# Patient Record
Sex: MALE | Race: BLACK OR AFRICAN AMERICAN | NOT HISPANIC OR LATINO | URBAN - METROPOLITAN AREA
[De-identification: names, ages, dates, MRNs, and addresses within clinical notes are randomized per-mention and may not be internally consistent; named-entity substitution may affect disease eponyms.]

---

## 2017-01-10 ENCOUNTER — EMERGENCY (EMERGENCY)
Facility: HOSPITAL | Age: 47
LOS: 1 days | Discharge: OTHER INSTITUTION | End: 2017-01-10
Attending: EMERGENCY MEDICINE | Admitting: EMERGENCY MEDICINE
Payer: MEDICAID

## 2017-01-10 VITALS
DIASTOLIC BLOOD PRESSURE: 75 MMHG | SYSTOLIC BLOOD PRESSURE: 118 MMHG | OXYGEN SATURATION: 97 % | RESPIRATION RATE: 18 BRPM | HEART RATE: 66 BPM

## 2017-01-10 VITALS
DIASTOLIC BLOOD PRESSURE: 77 MMHG | SYSTOLIC BLOOD PRESSURE: 115 MMHG | HEART RATE: 93 BPM | OXYGEN SATURATION: 96 % | WEIGHT: 175.05 LBS | TEMPERATURE: 98 F | RESPIRATION RATE: 17 BRPM

## 2017-01-10 DIAGNOSIS — Z95.5 PRESENCE OF CORONARY ANGIOPLASTY IMPLANT AND GRAFT: Chronic | ICD-10-CM

## 2017-01-10 LAB
ALBUMIN SERPL ELPH-MCNC: 3.6 G/DL — SIGNIFICANT CHANGE UP (ref 3.4–5)
ALP SERPL-CCNC: 59 U/L — SIGNIFICANT CHANGE UP (ref 40–120)
ALT FLD-CCNC: 76 U/L — HIGH (ref 12–42)
ANION GAP SERPL CALC-SCNC: 11 MMOL/L — SIGNIFICANT CHANGE UP (ref 9–16)
APTT BLD: 28.2 SEC — SIGNIFICANT CHANGE UP (ref 27.5–36.5)
AST SERPL-CCNC: 51 U/L — HIGH (ref 15–37)
BILIRUB SERPL-MCNC: 0.2 MG/DL — SIGNIFICANT CHANGE UP (ref 0.2–1.2)
BUN SERPL-MCNC: 10 MG/DL — SIGNIFICANT CHANGE UP (ref 7–23)
CALCIUM SERPL-MCNC: 9 MG/DL — SIGNIFICANT CHANGE UP (ref 8.5–10.5)
CHLORIDE SERPL-SCNC: 104 MMOL/L — SIGNIFICANT CHANGE UP (ref 96–108)
CK MB BLD-MCNC: 0.97 % — SIGNIFICANT CHANGE UP
CK MB CFR SERPL CALC: 6.3 NG/ML — HIGH (ref 0.5–3.6)
CK SERPL-CCNC: 651 U/L — HIGH (ref 39–308)
CO2 SERPL-SCNC: 26 MMOL/L — SIGNIFICANT CHANGE UP (ref 22–31)
CREAT SERPL-MCNC: 1.18 MG/DL — SIGNIFICANT CHANGE UP (ref 0.5–1.3)
ETHANOL SERPL-MCNC: 63 MG/DL — HIGH
GLUCOSE SERPL-MCNC: 140 MG/DL — HIGH (ref 70–99)
HCT VFR BLD CALC: 36.2 % — LOW (ref 39–50)
HGB BLD-MCNC: 12.2 G/DL — LOW (ref 13–17)
INR BLD: 1.04 — SIGNIFICANT CHANGE UP (ref 0.88–1.16)
MCHC RBC-ENTMCNC: 30 PG — SIGNIFICANT CHANGE UP (ref 27–34)
MCHC RBC-ENTMCNC: 33.7 G/DL — SIGNIFICANT CHANGE UP (ref 32–36)
MCV RBC AUTO: 88.9 FL — SIGNIFICANT CHANGE UP (ref 80–100)
PLATELET # BLD AUTO: 334 K/UL — SIGNIFICANT CHANGE UP (ref 150–400)
POTASSIUM SERPL-MCNC: 3.6 MMOL/L — SIGNIFICANT CHANGE UP (ref 3.5–5.3)
POTASSIUM SERPL-SCNC: 3.6 MMOL/L — SIGNIFICANT CHANGE UP (ref 3.5–5.3)
PROT SERPL-MCNC: 7.2 G/DL — SIGNIFICANT CHANGE UP (ref 6.4–8.2)
PROTHROM AB SERPL-ACNC: 11.4 SEC — SIGNIFICANT CHANGE UP (ref 10–13.1)
RBC # BLD: 4.07 M/UL — LOW (ref 4.2–5.8)
RBC # FLD: 13.3 % — SIGNIFICANT CHANGE UP (ref 10.3–16.9)
SODIUM SERPL-SCNC: 141 MMOL/L — SIGNIFICANT CHANGE UP (ref 132–145)
TROPONIN I SERPL-MCNC: <0.017 NG/ML — LOW (ref 0.02–0.06)
WBC # BLD: 5.2 K/UL — SIGNIFICANT CHANGE UP (ref 3.8–10.5)
WBC # FLD AUTO: 5.2 K/UL — SIGNIFICANT CHANGE UP (ref 3.8–10.5)

## 2017-01-10 PROCEDURE — 93010 ELECTROCARDIOGRAM REPORT: CPT

## 2017-01-10 PROCEDURE — 99291 CRITICAL CARE FIRST HOUR: CPT | Mod: 25

## 2017-01-10 PROCEDURE — 71010: CPT | Mod: 26

## 2017-01-10 RX ORDER — ASPIRIN/CALCIUM CARB/MAGNESIUM 324 MG
162 TABLET ORAL ONCE
Qty: 0 | Refills: 0 | Status: COMPLETED | OUTPATIENT
Start: 2017-01-10 | End: 2017-01-10

## 2017-01-10 RX ORDER — HEPARIN SODIUM 5000 [USP'U]/ML
INJECTION INTRAVENOUS; SUBCUTANEOUS
Qty: 25000 | Refills: 0 | Status: DISCONTINUED | OUTPATIENT
Start: 2017-01-10 | End: 2017-01-14

## 2017-01-10 RX ORDER — NITROGLYCERIN 6.5 MG
0.4 CAPSULE, EXTENDED RELEASE ORAL ONCE
Qty: 0 | Refills: 0 | Status: COMPLETED | OUTPATIENT
Start: 2017-01-10 | End: 2017-01-10

## 2017-01-10 RX ORDER — TICAGRELOR 90 MG/1
180 TABLET ORAL ONCE
Qty: 0 | Refills: 0 | Status: COMPLETED | OUTPATIENT
Start: 2017-01-10 | End: 2017-01-10

## 2017-01-10 RX ORDER — HEPARIN SODIUM 5000 [USP'U]/ML
4000 INJECTION INTRAVENOUS; SUBCUTANEOUS ONCE
Qty: 0 | Refills: 0 | Status: COMPLETED | OUTPATIENT
Start: 2017-01-10 | End: 2017-01-10

## 2017-01-10 RX ORDER — ATORVASTATIN CALCIUM 80 MG/1
80 TABLET, FILM COATED ORAL ONCE
Qty: 0 | Refills: 0 | Status: COMPLETED | OUTPATIENT
Start: 2017-01-10 | End: 2017-01-10

## 2017-01-10 RX ADMIN — HEPARIN SODIUM 950 UNIT(S)/HR: 5000 INJECTION INTRAVENOUS; SUBCUTANEOUS at 07:39

## 2017-01-10 RX ADMIN — Medication 162 MILLIGRAM(S): at 07:10

## 2017-01-10 RX ADMIN — Medication 162 MILLIGRAM(S): at 05:23

## 2017-01-10 RX ADMIN — HEPARIN SODIUM 4000 UNIT(S): 5000 INJECTION INTRAVENOUS; SUBCUTANEOUS at 07:20

## 2017-01-10 RX ADMIN — Medication 0.4 MILLIGRAM(S): at 07:10

## 2017-01-10 RX ADMIN — ATORVASTATIN CALCIUM 80 MILLIGRAM(S): 80 TABLET, FILM COATED ORAL at 07:20

## 2017-01-10 RX ADMIN — TICAGRELOR 180 MILLIGRAM(S): 90 TABLET ORAL at 07:20

## 2017-01-10 NOTE — ED PROVIDER NOTE - CARE PLAN
Principal Discharge DX:	Chest pain, unspecified type Principal Discharge DX:	STEMI (ST elevation myocardial infarction)  Secondary Diagnosis:	Chest pain

## 2017-01-10 NOTE — ED PROVIDER NOTE - MEDICAL DECISION MAKING DETAILS
Pt here with K2 and cocaine use- dev't chest pain with EKG changes in ED and was set up for transfer to  for r/o STEMI. Pt here with K2 and cocaine use- dev't chest pain with EKG changes in ED and was set up for transfer to  for r/o STEMI.  Case discussed with Dr. Napoles who recommends transfer to [facility]. [facility] contacted, DASH announced and case endorsed to Dr. Mota (ED Attending) who has accepted the pt transfer. Case then discussed with Dr. You ([facility] Cardiologist) who recommends initiating Heparin, Brilinta and Atorvastatin. Pt A&Ox3, NAD at this time. DASH Transfer initiated to .

## 2017-01-10 NOTE — ED PROVIDER NOTE - CRITICAL CARE PROVIDED
consultation with other physicians/additional history taking/direct patient care (not related to procedure)/documentation/interpretation of diagnostic studies

## 2017-01-10 NOTE — ED PROVIDER NOTE - DETAILS:
Pt here with crack and K2 use. Complained of chest pressure after some time in ED. Trop neg, second EKG showed evolving ST elevation in lateral leads. Pt revealed that he had hx of CAD and had two stents placed recently at [facility]. Seems to be non-compliant with meds. Case discussed with cards who rec transfer to  for poss STEMI. Pt current saying that he will refuse procedure. Will give NTG. Pt got ASA.

## 2017-01-10 NOTE — ED ADULT NURSE NOTE - CHIEF COMPLAINT QUOTE
bibems after found sleeping on floor of [employer] station, pt. admits to smoking K2 and crack. c/o not feeling well.

## 2017-01-10 NOTE — ED PROVIDER NOTE - OBJECTIVE STATEMENT
45 y/o M with PMH of drug use BIBA from [facility] for drug intoxication. Pt endorses smoking K2 and crack tonight and now endorses dizziness and inability to walk. He otherwise denies any other complaints at this time.    Denies fever, chills, headache, syncope, CP, SOB, abdo pain, N/V/D

## 2017-01-10 NOTE — ED ADULT TRIAGE NOTE - CHIEF COMPLAINT QUOTE
bibems after found sleeping on floor of [employer] station, pt. admits to smoking K2 and crack. c/o not feeling well.

## 2017-01-10 NOTE — ED PROVIDER NOTE - PROGRESS NOTE DETAILS
While in ED, pt states his chest began to feel "heavy" while lying in the stretcher and now endorses having "stents" placed in his chest earlier this year at ?[facility] 2/2 alleged drug-induced ACS. Will initiate a cardiac workup. While in ED, pt states his chest began to feel "heavy" while lying in the stretcher and now endorses having "two stents" placed in his heart 2 weeks ago at [facility] 2/2 alleged drug-induced ACS. Will initiate a cardiac workup. Elevated CKMB. Repeat EKG showing evolution with ST elevation in leads V5-V6. Consult with Dr. Napoles.

## 2017-02-08 ENCOUNTER — EMERGENCY (EMERGENCY)
Facility: HOSPITAL | Age: 47
LOS: 1 days | Discharge: HOME | End: 2017-02-08
Attending: EMERGENCY MEDICINE | Admitting: EMERGENCY MEDICINE
Payer: MEDICAID

## 2017-02-08 VITALS
OXYGEN SATURATION: 98 % | HEART RATE: 74 BPM | RESPIRATION RATE: 16 BRPM | TEMPERATURE: 98 F | DIASTOLIC BLOOD PRESSURE: 63 MMHG | SYSTOLIC BLOOD PRESSURE: 106 MMHG

## 2017-02-08 VITALS
OXYGEN SATURATION: 98 % | DIASTOLIC BLOOD PRESSURE: 74 MMHG | SYSTOLIC BLOOD PRESSURE: 135 MMHG | RESPIRATION RATE: 20 BRPM | HEART RATE: 92 BPM | TEMPERATURE: 98 F

## 2017-02-08 DIAGNOSIS — F10.129 ALCOHOL ABUSE WITH INTOXICATION, UNSPECIFIED: ICD-10-CM

## 2017-02-08 DIAGNOSIS — R07.89 OTHER CHEST PAIN: ICD-10-CM

## 2017-02-08 DIAGNOSIS — F14.10 COCAINE ABUSE, UNCOMPLICATED: ICD-10-CM

## 2017-02-08 DIAGNOSIS — Z95.5 PRESENCE OF CORONARY ANGIOPLASTY IMPLANT AND GRAFT: Chronic | ICD-10-CM

## 2017-02-08 LAB
ALBUMIN SERPL ELPH-MCNC: 3.7 G/DL — SIGNIFICANT CHANGE UP (ref 3.4–5)
ALP SERPL-CCNC: 63 U/L — SIGNIFICANT CHANGE UP (ref 40–120)
ALT FLD-CCNC: 38 U/L — SIGNIFICANT CHANGE UP (ref 12–42)
ANION GAP SERPL CALC-SCNC: 14 MMOL/L — SIGNIFICANT CHANGE UP (ref 9–16)
AST SERPL-CCNC: 33 U/L — SIGNIFICANT CHANGE UP (ref 15–37)
BILIRUB SERPL-MCNC: 0.4 MG/DL — SIGNIFICANT CHANGE UP (ref 0.2–1.2)
BUN SERPL-MCNC: 6 MG/DL — LOW (ref 7–23)
CALCIUM SERPL-MCNC: 8.9 MG/DL — SIGNIFICANT CHANGE UP (ref 8.5–10.5)
CHLORIDE SERPL-SCNC: 104 MMOL/L — SIGNIFICANT CHANGE UP (ref 96–108)
CK SERPL-CCNC: 716 U/L — HIGH (ref 39–308)
CK SERPL-CCNC: 740 U/L — HIGH (ref 39–308)
CO2 SERPL-SCNC: 23 MMOL/L — SIGNIFICANT CHANGE UP (ref 22–31)
CREAT SERPL-MCNC: 1.14 MG/DL — SIGNIFICANT CHANGE UP (ref 0.5–1.3)
ETHANOL SERPL-MCNC: 161 MG/DL — HIGH
GLUCOSE SERPL-MCNC: 85 MG/DL — SIGNIFICANT CHANGE UP (ref 70–99)
HCT VFR BLD CALC: 36.3 % — LOW (ref 39–50)
HGB BLD-MCNC: 12.1 G/DL — LOW (ref 13–17)
MCHC RBC-ENTMCNC: 29.4 PG — SIGNIFICANT CHANGE UP (ref 27–34)
MCHC RBC-ENTMCNC: 33.3 G/DL — SIGNIFICANT CHANGE UP (ref 32–36)
MCV RBC AUTO: 88.3 FL — SIGNIFICANT CHANGE UP (ref 80–100)
PCP SPEC-MCNC: SIGNIFICANT CHANGE UP
PLATELET # BLD AUTO: 299 K/UL — SIGNIFICANT CHANGE UP (ref 150–400)
POTASSIUM SERPL-MCNC: 3.4 MMOL/L — LOW (ref 3.5–5.3)
POTASSIUM SERPL-SCNC: 3.4 MMOL/L — LOW (ref 3.5–5.3)
PROT SERPL-MCNC: 7.1 G/DL — SIGNIFICANT CHANGE UP (ref 6.4–8.2)
RBC # BLD: 4.11 M/UL — LOW (ref 4.2–5.8)
RBC # FLD: 13.7 % — SIGNIFICANT CHANGE UP (ref 10.3–16.9)
SODIUM SERPL-SCNC: 141 MMOL/L — SIGNIFICANT CHANGE UP (ref 132–145)
TROPONIN I SERPL-MCNC: <0.017 NG/ML — LOW (ref 0.02–0.06)
TROPONIN I SERPL-MCNC: <0.017 NG/ML — LOW (ref 0.02–0.06)
WBC # BLD: 6.7 K/UL — SIGNIFICANT CHANGE UP (ref 3.8–10.5)
WBC # FLD AUTO: 6.7 K/UL — SIGNIFICANT CHANGE UP (ref 3.8–10.5)

## 2017-02-08 PROCEDURE — 99285 EMERGENCY DEPT VISIT HI MDM: CPT | Mod: 25

## 2017-02-08 PROCEDURE — 93010 ELECTROCARDIOGRAM REPORT: CPT | Mod: 76

## 2017-02-08 RX ORDER — ASPIRIN/CALCIUM CARB/MAGNESIUM 324 MG
162 TABLET ORAL ONCE
Qty: 0 | Refills: 0 | Status: COMPLETED | OUTPATIENT
Start: 2017-02-08 | End: 2017-02-08

## 2017-02-08 RX ORDER — FAMOTIDINE 10 MG/ML
20 INJECTION INTRAVENOUS ONCE
Qty: 0 | Refills: 0 | Status: COMPLETED | OUTPATIENT
Start: 2017-02-08 | End: 2017-02-08

## 2017-02-08 RX ORDER — SODIUM CHLORIDE 9 MG/ML
1000 INJECTION INTRAMUSCULAR; INTRAVENOUS; SUBCUTANEOUS ONCE
Qty: 0 | Refills: 0 | Status: COMPLETED | OUTPATIENT
Start: 2017-02-08 | End: 2017-02-08

## 2017-02-08 RX ADMIN — Medication 50 MILLIGRAM(S): at 03:52

## 2017-02-08 RX ADMIN — SODIUM CHLORIDE 2000 MILLILITER(S): 9 INJECTION INTRAMUSCULAR; INTRAVENOUS; SUBCUTANEOUS at 03:34

## 2017-02-08 RX ADMIN — FAMOTIDINE 20 MILLIGRAM(S): 10 INJECTION INTRAVENOUS at 02:57

## 2017-02-08 RX ADMIN — Medication 162 MILLIGRAM(S): at 03:47

## 2017-02-08 RX ADMIN — SODIUM CHLORIDE 2000 MILLILITER(S): 9 INJECTION INTRAMUSCULAR; INTRAVENOUS; SUBCUTANEOUS at 02:50

## 2017-02-08 NOTE — ED PROVIDER NOTE - MEDICAL DECISION MAKING DETAILS
pt with cocaine and alcohol use, EKG with early repol changes, labs with elevated CK but trop neg, s/p IVF, ASA, and valium, AFVSS at time of d/c, pt non-toxic appearing and hemodynamically stable, results, ddx, and f/u plans discussed with pt at bedside, d/c'd home to f/u with cardio and detox info provided, strict return precautions discussed, prompt return to ER for any worsening or new sx, pt verbalized understanding. pt with cocaine and alcohol use, EKG with early repol changes, labs with elevated CK but trop neg x 2, s/p IVF, ASA, and valium, AFVSS at time of d/c, pt non-toxic appearing and hemodynamically stable, results, ddx, and f/u plans discussed with pt at bedside, d/c'd home to f/u with cardio and detox info provided, strict return precautions discussed, prompt return to ER for any worsening or new sx, pt verbalized understanding.

## 2017-02-08 NOTE — ED PROVIDER NOTE - CARE PLAN
Principal Discharge DX:	Cocaine abuse  Secondary Diagnosis:	Alcohol intoxication  Secondary Diagnosis:	Chest pain, unspecified type

## 2017-02-08 NOTE — ED ADULT NURSE REASSESSMENT NOTE - NS ED NURSE REASSESS COMMENT FT1
assumed care of patient from RN Kylee; pt sleepign in stretcher in hallway; pt ambulated to bathroom as per RN; will continue to monitor

## 2017-02-08 NOTE — ED PROVIDER NOTE - OBJECTIVE STATEMENT
47 yo M with PMHx of polysubstance and alcohol abuse, CAD s/p stent, BIBA for CP x 1d.  Pt admits to drinking "a lot of alcohol and smoked a lot of crack" yesterday and now with L sided CP and lightheadedness started yesterday.  Denies fever, chills, palpitations, diaphoresis, PEDRO, SOB, orthopnea, hemoptysis, wheezing, peripheral edema, focal weakness, numbness, tingling, paresthesia, HA, dizziness, neck pain, N/V/D/C, abdominal pain, trauma, fall, and rash. Given ASA 162mg PTA to the ED by EMS.

## 2017-02-08 NOTE — ED ADULT NURSE NOTE - CHPI ED SYMPTOMS NEG
no chills/no diaphoresis/no back pain/no cough/no dizziness/no fever/no syncope/no nausea/no shortness of breath/no vomiting

## 2017-12-21 ENCOUNTER — EMERGENCY (EMERGENCY)
Facility: HOSPITAL | Age: 47
LOS: 1 days | Discharge: HOME | End: 2017-12-21
Admitting: EMERGENCY MEDICINE
Payer: MEDICAID

## 2017-12-21 VITALS
HEART RATE: 95 BPM | SYSTOLIC BLOOD PRESSURE: 141 MMHG | OXYGEN SATURATION: 98 % | TEMPERATURE: 98 F | RESPIRATION RATE: 18 BRPM | DIASTOLIC BLOOD PRESSURE: 80 MMHG

## 2017-12-21 DIAGNOSIS — R41.82 ALTERED MENTAL STATUS, UNSPECIFIED: ICD-10-CM

## 2017-12-21 DIAGNOSIS — T40.7X1A POISONING BY CANNABIS (DERIVATIVES), ACCIDENTAL (UNINTENTIONAL), INITIAL ENCOUNTER: ICD-10-CM

## 2017-12-21 DIAGNOSIS — F17.200 NICOTINE DEPENDENCE, UNSPECIFIED, UNCOMPLICATED: ICD-10-CM

## 2017-12-21 DIAGNOSIS — Z95.5 PRESENCE OF CORONARY ANGIOPLASTY IMPLANT AND GRAFT: Chronic | ICD-10-CM

## 2017-12-21 PROCEDURE — 99284 EMERGENCY DEPT VISIT MOD MDM: CPT

## 2017-12-21 NOTE — ED PROVIDER NOTE - OBJECTIVE STATEMENT
46 yo M with PMHx of MI and substance abuse, BIBA for public intoxication. 48 yo M with PMHx of MI and substance abuse, BIBA for public intoxication. Pt admits to smoking K2 today and was feeling nauseated with lightheadedness.  Denies trauma, fall, HA, LOC, bleeding, N/V/D/C, CP, SOB, palpitations, tremors, change in urinary/bowel function, and abdominal pain. Denies alcohol use today

## 2017-12-21 NOTE — ED PROVIDER NOTE - PHYSICAL EXAMINATION
Gen - Unkempt M, no acute distress  Skin - warm, dry, intact  HEENT - AT/NC, PERRL, mild conjunctival injection, o/p clear, uvula midline, airway patent, neck supple with no step off or midline tenderness, FROM   CV - S1S2, R/R/R  Resp - respiration non-labored, CTAB, symmetric bs b/l, no r/r/w  GI - NABS, soft, ND, NT, no rebound or guarding, no CVAT b/l  MS - w/w/p, no c/c/e, calves supple and NT  Neuro - Alert and awake, normal speech, ambulatory steady gait, no focal deficits

## 2017-12-21 NOTE — ED PROVIDER NOTE - MEDICAL DECISION MAKING DETAILS
pt here for public intox, monitored in the ED with stable mental status, offered food, FS wnl, clear speech, ambulatory, medically stable for d/c.

## 2018-06-10 NOTE — ED ADULT NURSE NOTE - NS PRO PASSIVE SMOKE EXP
EXAM: Right humerus, two views 

  

HISTORY:  Fall, injury 

  

COMPARISON:  None. 

  

FINDINGS:  There is an acute fracture involving the head of the right humerus.  There is a crescentri
c 3.4 x 1.0 cm fracture fragment seen adjacent to the lateral cortex of the right humeral head.  Ther
e is no dislocation of the right humeral head.  The scapula appears intact.  Mild degenerative change
s of the right AC joint  seen. 

  

Impression 

  

There is an acute fracture of the periphery or lateral aspect of the right humeral head without dislo
cation of the right humeral head.  There is a laterally displaced crescentric 3.4 x 1.0 cm fracture f
ragment 

  

Transscapular view also shows minimal cortical irregularity medial aspect of the humeral head that ma
y be due to a nondisplaced fracture Injury to the rotator cuff not excluded Unknown

## 2018-10-17 NOTE — ED ADULT NURSE NOTE - PT NEEDS ASSIST
I performed a brief evaluation, including history and physical, of the patient here in triage and I have determined that pt will need further treatment and evaluation from the main side ER physician. I have placed initial orders to help in expediting patients care. October 17, 2018 at 11:42 AM - Jeanie Lo PA-C Visit Vitals /81 (BP 1 Location: Left arm, BP Patient Position: Sitting) Pulse 75 Temp 98.3 °F (36.8 °C) Resp 18 Ht 5' 7\" (1.702 m) Wt 111.6 kg (246 lb) SpO2 99% BMI 38.53 kg/m² no

## 2021-05-28 NOTE — ED ADULT NURSE NOTE - HARM RISK FACTORS
Post infusion patient reconnected to home infusion pump. Had labs drawn at the [facility] yesterday states that he needs them drawn again tomorrow.    yes

## 2021-12-26 ENCOUNTER — HOSPITAL ENCOUNTER (EMERGENCY)
Facility: HOSPITAL | Age: 51
Discharge: HOME | End: 2021-12-26
Attending: EMERGENCY MEDICINE
Payer: COMMERCIAL

## 2021-12-26 VITALS
TEMPERATURE: 99.7 F | OXYGEN SATURATION: 94 % | RESPIRATION RATE: 21 BRPM | WEIGHT: 161.6 LBS | DIASTOLIC BLOOD PRESSURE: 62 MMHG | SYSTOLIC BLOOD PRESSURE: 132 MMHG | HEART RATE: 82 BPM

## 2021-12-26 DIAGNOSIS — U07.1 CLINICAL DIAGNOSIS OF COVID-19: Primary | ICD-10-CM

## 2021-12-26 PROBLEM — F10.11 ALCOHOL ABUSE, IN REMISSION: Chronic | Status: ACTIVE | Noted: 2021-12-26

## 2021-12-26 PROBLEM — B20 HIV (HUMAN IMMUNODEFICIENCY VIRUS INFECTION) (HCC): Chronic | Status: ACTIVE | Noted: 2021-12-26

## 2021-12-26 PROBLEM — F17.200 SMOKER: Chronic | Status: ACTIVE | Noted: 2021-12-26

## 2021-12-26 PROBLEM — F14.11 COCAINE ABUSE IN REMISSION (HCC): Chronic | Status: ACTIVE | Noted: 2021-12-26

## 2021-12-26 PROBLEM — I10 HTN (HYPERTENSION): Chronic | Status: ACTIVE | Noted: 2021-12-26

## 2021-12-26 LAB
ATRIAL RATE: 88 BPM
P AXIS: 56 DEGREES
PR INTERVAL: 146 MS
QRS AXIS: 60 DEGREES
QRSD INTERVAL: 80 MS
QT INTERVAL: 360 MS
QTC INTERVAL: 435 MS
T WAVE AXIS: 51 DEGREES
VENTRICULAR RATE: 88 BPM

## 2021-12-26 PROCEDURE — 93010 ELECTROCARDIOGRAM REPORT: CPT | Performed by: INTERNAL MEDICINE

## 2021-12-26 PROCEDURE — 93005 ELECTROCARDIOGRAM TRACING: CPT

## 2021-12-26 PROCEDURE — 99282 EMERGENCY DEPT VISIT SF MDM: CPT | Performed by: EMERGENCY MEDICINE

## 2021-12-26 PROCEDURE — 87636 SARSCOV2 & INF A&B AMP PRB: CPT | Performed by: EMERGENCY MEDICINE

## 2021-12-26 PROCEDURE — 99284 EMERGENCY DEPT VISIT MOD MDM: CPT

## 2021-12-26 NOTE — ED PROVIDER NOTES
History  Chief Complaint   Patient presents with    Cold Like Symptoms     Patient started having a cough, chills, fever, headache, and weakness starting yesterday morning  Other people in the house are sick and he is vaccinated for covid  Hx of HTN and HIV       3 days ago contact with covid  Pt has had primary vaccinations but no booster  PMH HIV with VL undet; ex-cocaine; smoker; HTN; ex-EtOH  2 days of cough, malaise, fever, chills, mild global gradual onset headache  No neck stiffness/photophobia/rash  Prior to Admission Medications   Prescriptions Last Dose Informant Patient Reported? Taking? NON FORMULARY   Yes Yes   Si tablet daily   NON FORMULARY   Yes Yes   Si tablet daily      Facility-Administered Medications: None       Past Medical History:   Diagnosis Date    [facility]     Hypertension        History reviewed  No pertinent surgical history  History reviewed  No pertinent family history  I have reviewed and agree with the history as documented  E-Cigarette/Vaping    E-Cigarette Use Never User      E-Cigarette/Vaping Substances     Social History     Tobacco Use    Smoking status: Current Every Day Smoker    Smokeless tobacco: Never Used   Vaping Use    Vaping Use: Never used   Substance Use Topics    Alcohol use: Yes     Comment: SOCIAL    Drug use: Never       Review of Systems   Constitutional: Positive for appetite change, chills, diaphoresis, fatigue and fever  HENT: Negative for rhinorrhea  Eyes: Negative for visual disturbance  Respiratory: Positive for cough  Negative for shortness of breath  Cardiovascular: Negative for chest pain  Gastrointestinal: Negative for abdominal pain, diarrhea and vomiting  Endocrine: Negative for polydipsia  Genitourinary: Negative for dysuria, frequency and hematuria  Musculoskeletal: Positive for myalgias  Negative for neck stiffness  Skin: Negative for rash  Allergic/Immunologic: Negative for immunocompromised state  Neurological: Positive for headaches  Negative for speech difficulty, weakness and numbness  Psychiatric/Behavioral: Negative for suicidal ideas  Physical Exam  Physical Exam  Constitutional:       General: He is not in acute distress  HENT:      Head: Normocephalic and atraumatic  Right Ear: External ear normal       Left Ear: External ear normal       Nose: Nose normal       Mouth/Throat:      Pharynx: Oropharynx is clear  Eyes:      Conjunctiva/sclera: Conjunctivae normal    Cardiovascular:      Rate and Rhythm: Normal rate and regular rhythm  Heart sounds: Normal heart sounds  No murmur heard  Pulmonary:      Effort: Pulmonary effort is normal       Breath sounds: Normal breath sounds  Abdominal:      General: Bowel sounds are normal       Palpations: Abdomen is soft  There is no mass  Tenderness: There is no abdominal tenderness  There is no guarding  Musculoskeletal:         General: No swelling or tenderness  Cervical back: Normal range of motion and neck supple  Right lower leg: No edema  Left lower leg: No edema  Skin:     General: Skin is warm and dry  Capillary Refill: Capillary refill takes less than 2 seconds  Neurological:      General: No focal deficit present  Mental Status: He is alert and oriented to person, place, and time     Psychiatric:         Mood and Affect: Mood normal          Vital Signs  ED Triage Vitals   Temperature Pulse Respirations Blood Pressure SpO2   12/26/21 1304 12/26/21 1304 12/26/21 1304 12/26/21 1307 12/26/21 1304   99 7 °F (37 6 °C) 89 18 (!) 172/92 96 %      Temp Source Heart Rate Source Patient Position - Orthostatic VS BP Location FiO2 (%)   12/26/21 1304 12/26/21 1304 12/26/21 1304 12/26/21 1304 --   Temporal Monitor Sitting Right arm       Pain Score       12/26/21 1304       6           Vitals:    12/26/21 1304 12/26/21 1307 12/26/21 1315 12/26/21 1330   BP:  (!) 172/92 (!) 172/92 132/62   Pulse: 89  82 82   Patient Position - Orthostatic VS: Sitting Sitting Lying          Visual Acuity      ED Medications  Medications - No data to display    Diagnostic Studies  Results Reviewed     Procedure Component Value Units Date/Time    COVID/FLU - 24 hour TAT [[id number]] Collected: 12/26/21 1358    Lab Status: In process Specimen: Nasopharyngeal Swab Updated: 12/26/21 1400                 No orders to display              Procedures  Procedures         ED Course                               SBIRT 22yo+      Most Recent Value   SBIRT (25 yo +)    In order to provide better care to our patients, we are screening all of our patients for alcohol and drug use  Would it be okay to ask you these screening questions? Yes Filed at: 12/26/2021 1353   Initial Alcohol Screen: US AUDIT-C     1  How often do you have a drink containing alcohol? 1 Filed at: 12/26/2021 1353   2  How many drinks containing alcohol do you have on a typical day you are drinking? 0 Filed at: 12/26/2021 1353   3a  Male UNDER 65: How often do you have five or more drinks on one occasion? 0 Filed at: 12/26/2021 1353   3b  FEMALE Any Age, or MALE 65+: How often do you have 4 or more drinks on one occassion? 0 Filed at: 12/26/2021 1353   Audit-C Score 1 Filed at: 12/26/2021 1353   LEWIS: How many times in the past year have you    Used an illegal drug or used a prescription medication for non-medical reasons?  Never Filed at: 12/26/2021 1353                    MDM  Number of Diagnoses or Management Options  Clinical diagnosis of COVID-19  Diagnosis management comments: Clinical covid, quarantine and await results, no indication for inpatient care      Disposition  Final diagnoses:   Clinical diagnosis of COVID-19     Time reflects when diagnosis was documented in both MDM as applicable and the Disposition within this note     Time User Action Codes Description Comment    12/26/2021  1:48 PM Gloria Alexander Add [U07 1] Clinical diagnosis of COVID-19       ED Disposition     ED Disposition Condition Date/Time Comment    Discharge Stable Sun Dec 26, 2021  1:47 PM [place] discharge to home/self care  Follow-up Information     Follow up With Specialties Details Why Contact Info    Primary Care Provider - see in next 48 hours  Go in 2 days Return to ER if symptoms worsen or new symptoms develop           Discharge Medication List as of 12/26/2021  1:49 PM      CONTINUE these medications which have NOT CHANGED    Details   !! NON FORMULARY 1 tablet daily, Historical Med      !! NON FORMULARY 1 tablet daily, Historical Med       !! - Potential duplicate medications found  Please discuss with provider  No discharge procedures on file      PDMP Review     None          ED Provider  Electronically Signed by           Angelina Mirza MD  12/26/21 4666

## 2021-12-27 LAB
FLUAV RNA RESP QL NAA+PROBE: NEGATIVE
FLUBV RNA RESP QL NAA+PROBE: NEGATIVE
SARS-COV-2 RNA RESP QL NAA+PROBE: POSITIVE

## 2022-02-17 ENCOUNTER — HOSPITAL ENCOUNTER (EMERGENCY)
Facility: HOSPITAL | Age: 52
End: 2022-02-18
Attending: EMERGENCY MEDICINE
Payer: COMMERCIAL

## 2022-02-17 DIAGNOSIS — I10 HYPERTENSION: ICD-10-CM

## 2022-02-17 DIAGNOSIS — R45.1 AGITATION: ICD-10-CM

## 2022-02-17 DIAGNOSIS — Z00.8 MEDICAL CLEARANCE FOR PSYCHIATRIC ADMISSION: ICD-10-CM

## 2022-02-17 DIAGNOSIS — F20.9 SCHIZOPHRENIA (HCC): Primary | ICD-10-CM

## 2022-02-17 PROCEDURE — 99285 EMERGENCY DEPT VISIT HI MDM: CPT

## 2022-02-17 RX ORDER — DIAZEPAM 5 MG/ML
5 INJECTION, SOLUTION INTRAMUSCULAR; INTRAVENOUS ONCE
Status: DISCONTINUED | OUTPATIENT
Start: 2022-02-18 | End: 2022-02-17

## 2022-02-18 ENCOUNTER — HOSPITAL ENCOUNTER (INPATIENT)
Facility: HOSPITAL | Age: 52
LOS: 7 days | Discharge: HOME | DRG: 753 | End: 2022-02-25
Attending: HOSPITALIST | Admitting: HOSPITALIST
Payer: COMMERCIAL

## 2022-02-18 VITALS
TEMPERATURE: 97.9 F | BODY MASS INDEX: 24.1 KG/M2 | HEART RATE: 79 BPM | OXYGEN SATURATION: 98 % | DIASTOLIC BLOOD PRESSURE: 95 MMHG | RESPIRATION RATE: 18 BRPM | SYSTOLIC BLOOD PRESSURE: 139 MMHG | WEIGHT: 159 LBS | HEIGHT: 68 IN

## 2022-02-18 DIAGNOSIS — F31.5 BIPOLAR I DISORDER, CURRENT OR MOST RECENT EPISODE DEPRESSED, WITH PSYCHOTIC FEATURES (HCC): Primary | Chronic | ICD-10-CM

## 2022-02-18 DIAGNOSIS — Z72.0 TOBACCO ABUSE: ICD-10-CM

## 2022-02-18 DIAGNOSIS — G89.29 CHRONIC PAIN: ICD-10-CM

## 2022-02-18 DIAGNOSIS — E56.9 VITAMIN DEFICIENCY: ICD-10-CM

## 2022-02-18 DIAGNOSIS — Z00.8 MEDICAL CLEARANCE FOR PSYCHIATRIC ADMISSION: ICD-10-CM

## 2022-02-18 DIAGNOSIS — I10 HYPERTENSION: ICD-10-CM

## 2022-02-18 LAB
AMPHETAMINES SERPL QL SCN: NEGATIVE
ANION GAP SERPL CALCULATED.3IONS-SCNC: 14 MMOL/L (ref 4–13)
BARBITURATES UR QL: NEGATIVE
BASOPHILS # BLD AUTO: 0.04 THOUSANDS/ΜL (ref 0–0.1)
BASOPHILS NFR BLD AUTO: 1 % (ref 0–1)
BENZODIAZ UR QL: NEGATIVE
BUN SERPL-MCNC: 12 MG/DL (ref 5–25)
CALCIUM SERPL-MCNC: 9.9 MG/DL (ref 8.3–10.1)
CHLORIDE SERPL-SCNC: 100 MMOL/L (ref 100–108)
CO2 SERPL-SCNC: 24 MMOL/L (ref 21–32)
COCAINE UR QL: NEGATIVE
CREAT SERPL-MCNC: 1.94 MG/DL (ref 0.6–1.3)
EOSINOPHIL # BLD AUTO: 0.09 THOUSAND/ΜL (ref 0–0.61)
EOSINOPHIL NFR BLD AUTO: 2 % (ref 0–6)
ERYTHROCYTE [DISTWIDTH] IN BLOOD BY AUTOMATED COUNT: 12.7 % (ref 11.6–15.1)
FLUAV RNA RESP QL NAA+PROBE: NEGATIVE
FLUBV RNA RESP QL NAA+PROBE: NEGATIVE
GFR SERPL CREATININE-BSD FRML MDRD: 39 ML/MIN/1.73SQ M
GLUCOSE SERPL-MCNC: 94 MG/DL (ref 65–140)
HCT VFR BLD AUTO: 43.2 % (ref 36.5–49.3)
HGB BLD-MCNC: 15.1 G/DL (ref 12–17)
IMM GRANULOCYTES # BLD AUTO: 0.01 THOUSAND/UL (ref 0–0.2)
IMM GRANULOCYTES NFR BLD AUTO: 0 % (ref 0–2)
LYMPHOCYTES # BLD AUTO: 1.79 THOUSANDS/ΜL (ref 0.6–4.47)
LYMPHOCYTES NFR BLD AUTO: 29 % (ref 14–44)
MCH RBC QN AUTO: 30.3 PG (ref 26.8–34.3)
MCHC RBC AUTO-ENTMCNC: 35 G/DL (ref 31.4–37.4)
MCV RBC AUTO: 87 FL (ref 82–98)
METHADONE UR QL: NEGATIVE
MONOCYTES # BLD AUTO: 0.59 THOUSAND/ΜL (ref 0.17–1.22)
MONOCYTES NFR BLD AUTO: 10 % (ref 4–12)
NEUTROPHILS # BLD AUTO: 3.56 THOUSANDS/ΜL (ref 1.85–7.62)
NEUTS SEG NFR BLD AUTO: 59 % (ref 43–75)
NRBC BLD AUTO-RTO: 0 /100 WBCS
OPIATES UR QL SCN: NEGATIVE
OXYCODONE+OXYMORPHONE UR QL SCN: NEGATIVE
PCP UR QL: NEGATIVE
PLATELET # BLD AUTO: 357 THOUSANDS/UL (ref 149–390)
PMV BLD AUTO: 10 FL (ref 8.9–12.7)
POTASSIUM SERPL-SCNC: 3.3 MMOL/L (ref 3.5–5.3)
RBC # BLD AUTO: 4.98 MILLION/UL (ref 3.88–5.62)
RSV RNA RESP QL NAA+PROBE: NEGATIVE
SARS-COV-2 RNA RESP QL NAA+PROBE: NEGATIVE
SODIUM SERPL-SCNC: 138 MMOL/L (ref 136–145)
THC UR QL: POSITIVE
WBC # BLD AUTO: 6.08 THOUSAND/UL (ref 4.31–10.16)

## 2022-02-18 PROCEDURE — 80307 DRUG TEST PRSMV CHEM ANLYZR: CPT | Performed by: EMERGENCY MEDICINE

## 2022-02-18 PROCEDURE — 80048 BASIC METABOLIC PNL TOTAL CA: CPT | Performed by: EMERGENCY MEDICINE

## 2022-02-18 PROCEDURE — 99284 EMERGENCY DEPT VISIT MOD MDM: CPT | Performed by: EMERGENCY MEDICINE

## 2022-02-18 PROCEDURE — 36415 COLL VENOUS BLD VENIPUNCTURE: CPT | Performed by: EMERGENCY MEDICINE

## 2022-02-18 PROCEDURE — 96360 HYDRATION IV INFUSION INIT: CPT

## 2022-02-18 PROCEDURE — 0241U HB NFCT DS VIR RESP RNA 4 TRGT: CPT | Performed by: EMERGENCY MEDICINE

## 2022-02-18 PROCEDURE — 96361 HYDRATE IV INFUSION ADD-ON: CPT

## 2022-02-18 PROCEDURE — 85025 COMPLETE CBC W/AUTO DIFF WBC: CPT | Performed by: EMERGENCY MEDICINE

## 2022-02-18 RX ORDER — ACETAMINOPHEN 325 MG/1
650 TABLET ORAL EVERY 6 HOURS PRN
Status: DISCONTINUED | OUTPATIENT
Start: 2022-02-18 | End: 2022-02-25 | Stop reason: HOSPADM

## 2022-02-18 RX ORDER — BUPROPION HYDROCHLORIDE 150 MG/1
150 TABLET, EXTENDED RELEASE ORAL 2 TIMES DAILY
COMMUNITY
End: 2022-02-25 | Stop reason: HOSPADM

## 2022-02-18 RX ORDER — MINERAL OIL AND PETROLATUM 150; 830 MG/G; MG/G
1 OINTMENT OPHTHALMIC
Status: CANCELLED | OUTPATIENT
Start: 2022-02-18

## 2022-02-18 RX ORDER — TRAZODONE HYDROCHLORIDE 50 MG/1
50 TABLET ORAL
Status: DISCONTINUED | OUTPATIENT
Start: 2022-02-18 | End: 2022-02-21

## 2022-02-18 RX ORDER — ACETAMINOPHEN 325 MG/1
650 TABLET ORAL EVERY 4 HOURS PRN
Status: DISCONTINUED | OUTPATIENT
Start: 2022-02-18 | End: 2022-02-25 | Stop reason: HOSPADM

## 2022-02-18 RX ORDER — ACETAMINOPHEN 325 MG/1
975 TABLET ORAL EVERY 6 HOURS PRN
Status: DISCONTINUED | OUTPATIENT
Start: 2022-02-18 | End: 2022-02-25 | Stop reason: HOSPADM

## 2022-02-18 RX ORDER — BENZTROPINE MESYLATE 1 MG/ML
0.5 INJECTION INTRAMUSCULAR; INTRAVENOUS
Status: CANCELLED | OUTPATIENT
Start: 2022-02-18

## 2022-02-18 RX ORDER — LIDOCAINE 50 MG/G
2 PATCH TOPICAL DAILY
Status: DISCONTINUED | OUTPATIENT
Start: 2022-02-19 | End: 2022-02-20

## 2022-02-18 RX ORDER — LORAZEPAM 2 MG/ML
2 INJECTION INTRAMUSCULAR
Status: CANCELLED | OUTPATIENT
Start: 2022-02-18

## 2022-02-18 RX ORDER — LORAZEPAM 2 MG/ML
1 INJECTION INTRAMUSCULAR EVERY 4 HOURS PRN
Status: CANCELLED | OUTPATIENT
Start: 2022-02-18

## 2022-02-18 RX ORDER — BENZTROPINE MESYLATE 1 MG/ML
1 INJECTION INTRAMUSCULAR; INTRAVENOUS
Status: CANCELLED | OUTPATIENT
Start: 2022-02-18

## 2022-02-18 RX ORDER — HALOPERIDOL 5 MG/ML
2.5 INJECTION INTRAMUSCULAR
Status: CANCELLED | OUTPATIENT
Start: 2022-02-18

## 2022-02-18 RX ORDER — HYDROXYZINE HYDROCHLORIDE 25 MG/1
25 TABLET, FILM COATED ORAL
Status: CANCELLED | OUTPATIENT
Start: 2022-02-18

## 2022-02-18 RX ORDER — ACETAMINOPHEN 325 MG/1
650 TABLET ORAL EVERY 4 HOURS PRN
Status: CANCELLED | OUTPATIENT
Start: 2022-02-18

## 2022-02-18 RX ORDER — BUPROPION HYDROCHLORIDE 75 MG/1
150 TABLET ORAL 2 TIMES DAILY
Status: DISCONTINUED | OUTPATIENT
Start: 2022-02-18 | End: 2022-02-18 | Stop reason: HOSPADM

## 2022-02-18 RX ORDER — MAGNESIUM HYDROXIDE/ALUMINUM HYDROXICE/SIMETHICONE 120; 1200; 1200 MG/30ML; MG/30ML; MG/30ML
30 SUSPENSION ORAL EVERY 4 HOURS PRN
Status: CANCELLED | OUTPATIENT
Start: 2022-02-18

## 2022-02-18 RX ORDER — BENZTROPINE MESYLATE 1 MG/1
1 TABLET ORAL
Status: DISCONTINUED | OUTPATIENT
Start: 2022-02-18 | End: 2022-02-25 | Stop reason: HOSPADM

## 2022-02-18 RX ORDER — LORAZEPAM 2 MG/ML
2 INJECTION INTRAMUSCULAR
Status: DISCONTINUED | OUTPATIENT
Start: 2022-02-18 | End: 2022-02-25 | Stop reason: HOSPADM

## 2022-02-18 RX ORDER — AMLODIPINE BESYLATE 5 MG/1
5 TABLET ORAL DAILY
Status: DISCONTINUED | OUTPATIENT
Start: 2022-02-18 | End: 2022-02-18 | Stop reason: HOSPADM

## 2022-02-18 RX ORDER — BENZTROPINE MESYLATE 1 MG/ML
1 INJECTION INTRAMUSCULAR; INTRAVENOUS
Status: DISCONTINUED | OUTPATIENT
Start: 2022-02-18 | End: 2022-02-25 | Stop reason: HOSPADM

## 2022-02-18 RX ORDER — BISACODYL 10 MG
10 SUPPOSITORY, RECTAL RECTAL DAILY PRN
Status: CANCELLED | OUTPATIENT
Start: 2022-02-18

## 2022-02-18 RX ORDER — HALOPERIDOL 5 MG
5 TABLET ORAL
Status: CANCELLED | OUTPATIENT
Start: 2022-02-18

## 2022-02-18 RX ORDER — BUPROPION HYDROCHLORIDE 75 MG/1
150 TABLET ORAL 2 TIMES DAILY
Status: DISCONTINUED | OUTPATIENT
Start: 2022-02-18 | End: 2022-02-18

## 2022-02-18 RX ORDER — LORAZEPAM 2 MG/ML
1 INJECTION INTRAMUSCULAR
Status: DISCONTINUED | OUTPATIENT
Start: 2022-02-18 | End: 2022-02-25 | Stop reason: HOSPADM

## 2022-02-18 RX ORDER — HYDROXYZINE HYDROCHLORIDE 25 MG/1
50 TABLET, FILM COATED ORAL
Status: CANCELLED | OUTPATIENT
Start: 2022-02-18

## 2022-02-18 RX ORDER — HALOPERIDOL 1 MG/1
2 TABLET ORAL
Status: CANCELLED | OUTPATIENT
Start: 2022-02-18

## 2022-02-18 RX ORDER — LORAZEPAM 1 MG/1
1 TABLET ORAL
Status: DISCONTINUED | OUTPATIENT
Start: 2022-02-18 | End: 2022-02-25 | Stop reason: HOSPADM

## 2022-02-18 RX ORDER — HALOPERIDOL 2 MG/1
2 TABLET ORAL
Status: DISCONTINUED | OUTPATIENT
Start: 2022-02-18 | End: 2022-02-25 | Stop reason: HOSPADM

## 2022-02-18 RX ORDER — BUPROPION HYDROCHLORIDE 75 MG/1
150 TABLET ORAL 2 TIMES DAILY
Status: DISCONTINUED | OUTPATIENT
Start: 2022-02-18 | End: 2022-02-19

## 2022-02-18 RX ORDER — TRAZODONE HYDROCHLORIDE 50 MG/1
50 TABLET ORAL
Status: CANCELLED | OUTPATIENT
Start: 2022-02-18

## 2022-02-18 RX ORDER — LORAZEPAM 1 MG/1
1 TABLET ORAL
Status: CANCELLED | OUTPATIENT
Start: 2022-02-18

## 2022-02-18 RX ORDER — BUPROPION HYDROCHLORIDE 75 MG/1
150 TABLET ORAL 2 TIMES DAILY
Status: CANCELLED | OUTPATIENT
Start: 2022-02-18

## 2022-02-18 RX ORDER — NICOTINE 21 MG/24HR
1 PATCH, TRANSDERMAL 24 HOURS TRANSDERMAL DAILY
Status: DISCONTINUED | OUTPATIENT
Start: 2022-02-18 | End: 2022-02-25 | Stop reason: HOSPADM

## 2022-02-18 RX ORDER — NICOTINE 21 MG/24HR
1 PATCH, TRANSDERMAL 24 HOURS TRANSDERMAL DAILY
Status: CANCELLED | OUTPATIENT
Start: 2022-02-18

## 2022-02-18 RX ORDER — POLYETHYLENE GLYCOL 3350 17 G/17G
17 POWDER, FOR SOLUTION ORAL DAILY PRN
Status: CANCELLED | OUTPATIENT
Start: 2022-02-18

## 2022-02-18 RX ORDER — LANOLIN ALCOHOL/MO/W.PET/CERES
3 CREAM (GRAM) TOPICAL
Status: DISCONTINUED | OUTPATIENT
Start: 2022-02-18 | End: 2022-02-21

## 2022-02-18 RX ORDER — ACETAMINOPHEN 325 MG/1
975 TABLET ORAL EVERY 6 HOURS PRN
Status: CANCELLED | OUTPATIENT
Start: 2022-02-18

## 2022-02-18 RX ORDER — AMOXICILLIN 250 MG
1 CAPSULE ORAL DAILY PRN
Status: CANCELLED | OUTPATIENT
Start: 2022-02-18

## 2022-02-18 RX ORDER — HALOPERIDOL 5 MG
5 TABLET ORAL
Status: DISCONTINUED | OUTPATIENT
Start: 2022-02-18 | End: 2022-02-25 | Stop reason: HOSPADM

## 2022-02-18 RX ORDER — AMLODIPINE BESYLATE 5 MG/1
5 TABLET ORAL DAILY
Status: CANCELLED | OUTPATIENT
Start: 2022-02-19

## 2022-02-18 RX ORDER — ACETAMINOPHEN 325 MG/1
650 TABLET ORAL EVERY 6 HOURS PRN
Status: CANCELLED | OUTPATIENT
Start: 2022-02-18

## 2022-02-18 RX ORDER — BENZTROPINE MESYLATE 1 MG/1
1 TABLET ORAL
Status: CANCELLED | OUTPATIENT
Start: 2022-02-18

## 2022-02-18 RX ORDER — LANOLIN ALCOHOL/MO/W.PET/CERES
3 CREAM (GRAM) TOPICAL
Status: CANCELLED | OUTPATIENT
Start: 2022-02-18

## 2022-02-18 RX ORDER — HYDROXYZINE 50 MG/1
50 TABLET, FILM COATED ORAL
Status: DISCONTINUED | OUTPATIENT
Start: 2022-02-18 | End: 2022-02-25 | Stop reason: HOSPADM

## 2022-02-18 RX ORDER — HALOPERIDOL 5 MG/ML
5 INJECTION INTRAMUSCULAR
Status: CANCELLED | OUTPATIENT
Start: 2022-02-18

## 2022-02-18 RX ORDER — LORAZEPAM 1 MG/1
1 TABLET ORAL ONCE
Status: COMPLETED | OUTPATIENT
Start: 2022-02-18 | End: 2022-02-18

## 2022-02-18 RX ORDER — HALOPERIDOL 5 MG/ML
5 INJECTION INTRAMUSCULAR
Status: DISCONTINUED | OUTPATIENT
Start: 2022-02-18 | End: 2022-02-25 | Stop reason: HOSPADM

## 2022-02-18 RX ORDER — BISACODYL 10 MG
10 SUPPOSITORY, RECTAL RECTAL DAILY PRN
Status: DISCONTINUED | OUTPATIENT
Start: 2022-02-18 | End: 2022-02-25 | Stop reason: HOSPADM

## 2022-02-18 RX ORDER — POLYETHYLENE GLYCOL 3350 17 G/17G
17 POWDER, FOR SOLUTION ORAL DAILY PRN
Status: DISCONTINUED | OUTPATIENT
Start: 2022-02-18 | End: 2022-02-25 | Stop reason: HOSPADM

## 2022-02-18 RX ORDER — MAGNESIUM HYDROXIDE/ALUMINUM HYDROXICE/SIMETHICONE 120; 1200; 1200 MG/30ML; MG/30ML; MG/30ML
30 SUSPENSION ORAL EVERY 4 HOURS PRN
Status: DISCONTINUED | OUTPATIENT
Start: 2022-02-18 | End: 2022-02-25 | Stop reason: HOSPADM

## 2022-02-18 RX ORDER — BENZTROPINE MESYLATE 1 MG/ML
0.5 INJECTION INTRAMUSCULAR; INTRAVENOUS
Status: DISCONTINUED | OUTPATIENT
Start: 2022-02-18 | End: 2022-02-25 | Stop reason: HOSPADM

## 2022-02-18 RX ORDER — LORAZEPAM 2 MG/ML
1 INJECTION INTRAMUSCULAR EVERY 4 HOURS PRN
Status: DISCONTINUED | OUTPATIENT
Start: 2022-02-18 | End: 2022-02-25 | Stop reason: HOSPADM

## 2022-02-18 RX ORDER — MINERAL OIL AND PETROLATUM 150; 830 MG/G; MG/G
1 OINTMENT OPHTHALMIC
Status: DISCONTINUED | OUTPATIENT
Start: 2022-02-18 | End: 2022-02-25 | Stop reason: HOSPADM

## 2022-02-18 RX ORDER — LORAZEPAM 2 MG/ML
1 INJECTION INTRAMUSCULAR
Status: CANCELLED | OUTPATIENT
Start: 2022-02-18

## 2022-02-18 RX ORDER — HYDROXYZINE HYDROCHLORIDE 25 MG/1
25 TABLET, FILM COATED ORAL
Status: DISCONTINUED | OUTPATIENT
Start: 2022-02-18 | End: 2022-02-19

## 2022-02-18 RX ORDER — AMLODIPINE BESYLATE 5 MG/1
5 TABLET ORAL DAILY
Status: DISCONTINUED | OUTPATIENT
Start: 2022-02-19 | End: 2022-02-25 | Stop reason: HOSPADM

## 2022-02-18 RX ORDER — HALOPERIDOL 5 MG/ML
2.5 INJECTION INTRAMUSCULAR
Status: DISCONTINUED | OUTPATIENT
Start: 2022-02-18 | End: 2022-02-25 | Stop reason: HOSPADM

## 2022-02-18 RX ORDER — AMOXICILLIN 250 MG
1 CAPSULE ORAL DAILY PRN
Status: DISCONTINUED | OUTPATIENT
Start: 2022-02-18 | End: 2022-02-25 | Stop reason: HOSPADM

## 2022-02-18 RX ADMIN — AMLODIPINE BESYLATE 5 MG: 5 TABLET ORAL at 09:08

## 2022-02-18 RX ADMIN — BUPROPION HYDROCHLORIDE 150 MG: 75 TABLET, FILM COATED ORAL at 09:09

## 2022-02-18 RX ADMIN — SODIUM CHLORIDE 1000 ML: 0.9 INJECTION, SOLUTION INTRAVENOUS at 00:32

## 2022-02-18 RX ADMIN — NICOTINE POLACRILEX 2 MG: 2 GUM, CHEWING BUCCAL at 17:04

## 2022-02-18 RX ADMIN — TRAZODONE HYDROCHLORIDE 50 MG: 50 TABLET ORAL at 21:03

## 2022-02-18 RX ADMIN — LORAZEPAM 1 MG: 1 TABLET ORAL at 13:12

## 2022-02-18 RX ADMIN — Medication 3 MG: at 20:33

## 2022-02-18 RX ADMIN — BICTEGRAVIR SODIUM, EMTRICITABINE, AND TENOFOVIR ALAFENAMIDE FUMARATE 1 TABLET: 50; 200; 25 TABLET ORAL at 07:48

## 2022-02-18 RX ADMIN — BUPROPION HYDROCHLORIDE 150 MG: 75 TABLET, FILM COATED ORAL at 15:41

## 2022-02-18 RX ADMIN — NICOTINE 1 PATCH: 14 PATCH, EXTENDED RELEASE TRANSDERMAL at 14:15

## 2022-02-18 NOTE — ED NOTES
Pt complaining of increased anxiety  Remains cooperative with staff   Dr Ligia Joshi made aware and new order noted     Dm Rand, RN  02/18/22 2459

## 2022-02-18 NOTE — ED NOTES
Per delphine avendaño crisis, patient meets criteria for inpatient psych treatment  Will fax over assessment  States patient wants to sign a 201  Per provider patient does not need a 1:1 and can be Q15 minute checks        Gertrude Schroeder RN  02/18/22 5981

## 2022-02-18 NOTE — ED NOTES
Pt remains cooperative and respectful towards staff at this time     Aaron Ramírez, SASCHA  02/18/22 6337

## 2022-02-18 NOTE — EMTALA/ACUTE CARE TRANSFER
[facility] EMERGENCY DEPARTMENT  [address] [address]  Dept: [phone]      EMTALA TRANSFER CONSENT    NAME Keene Buerger DOB 1970                              MRN [id number]    I have been informed of my rights regarding examination, treatment, and transfer   by Dr Nayak att  providers found    Benefits: Other benefits (Include comment)_______________________ (behavioral health)    Risks: Potential for delay in receiving treatment      Consent for Transfer:  I acknowledge that my medical condition has been evaluated and explained to me by the emergency department physician or other qualified medical person and/or my attending physician, who has recommended that I be transferred to the service of  Accepting Physician: Lizzette Llamas PA-C at [address] [place] [address] Name, [address] : [address] [facility],[address]  The above potential benefits of such transfer, the potential risks associated with such transfer, and the probable risks of not being transferred have been explained to me, and I fully understand them  The doctor has explained that, in my case, the benefits of transfer outweigh the risks  I agree to be transferred  I authorize the performance of emergency medical procedures and treatments upon me in both transit and upon arrival at the receiving facility  Additionally, I authorize the release of any and all medical records to the receiving facility and request they be transported with me, if possible  I understand that the safest mode of transportation during a medical emergency is an ambulance and that the Hospital advocates the use of this mode of transport  Risks of traveling to the receiving facility by car, including absence of medical control, life sustaining equipment, such as oxygen, and medical personnel has been explained to me and I fully understand them      (RICK CORRECT BOX BELOW)  [  ]  I consent to the stated transfer and to be transported by ambulance/helicopter  [  ]  I consent to the stated transfer, but refuse transportation by ambulance and accept full responsibility for my transportation by car  I understand the risks of non-ambulance transfers and I exonerate the Hospital and its staff from any deterioration in my condition that results from this refusal     X___________________________________________    DATE  22  TIME________  Signature of patient or legally responsible individual signing on patient behalf           RELATIONSHIP TO PATIENT_________________________          Provider Certification    NAME Meenu Polanco                                        [facility] 1970                              MRN [id number]    A medical screening exam was performed on the above named patient  Based on the examination:    Condition Necessitating Transfer The primary encounter diagnosis was Schizophrenia ([facility] [address] )  Diagnoses of Agitation, Hypertension, and Medical clearance for psychiatric admission were also pertinent to this visit      Patient Condition: The patient has been stabilized such that within reasonable medical probability, no material deterioration of the patient condition or the condition of the unborn child(sayda) is likely to result from the transfer    Reason for Transfer: Level of Care needed not available at this facility    Transfer Requirements: [address]   · Space available and qualified personnel available for treatment as acknowledged by Adelso Lyons  · Agreed to accept transfer and to provide appropriate medical treatment as acknowledged by       Pradeep Magana PA-C  · Appropriate medical records of the examination and treatment of the patient are provided at the time of transfer   [address] [facility], [address] _______  · Transfer will be performed by qualified personnel from [address] [place] [address]  and appropriate transfer equipment as required, including the use of necessary and appropriate life support measures  Provider Certification: I have examined the patient and explained the following risks and benefits of being transferred/refusing transfer to the patient/family:  General risk, such as traffic hazards, adverse weather conditions, rough terrain or turbulence, possible failure of equipment (including vehicle or aircraft), or consequences of actions of persons outside the control of the transport personnel      Based on these reasonable risks and benefits to the patient and/or the unborn child(sayda), and based upon the information available at the time of the patients examination, I certify that the medical benefits reasonably to be expected from the provision of appropriate medical treatments at another medical facility outweigh the increasing risks, if any, to the individuals medical condition, and in the case of labor to the unborn child, from effecting the transfer      X____________________________________________ DATE 02/18/22        TIME_______      ORIGINAL - SEND TO MEDICAL RECORDS   COPY - SEND WITH PATIENT DURING TRANSFER

## 2022-02-18 NOTE — ED NOTES
Patient continues to get up OOB, bang on the window after being told multiple times to stay seated  Patient continues to ask to speak with police, I told him that I already talked to them and that they said he spoke with them  Patient states he is paranoid that someone is going to come here and shoot him        Cullen Pacheco RN  02/18/22 3063

## 2022-02-18 NOTE — ED NOTES
Crisis met with Patient in ED 3  Patient was calm and cooperative while speaking to Crisis  Patient stated he is feeling better now but is still requesting inpatient treatment  Patient agreed to sign a 201 after rights and a detailed explanation of the 72 hr notice were read to and reviewed with him
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
## 2022-02-18 NOTE — PLAN OF CARE
Problem: ANXIETY  Goal: Will report anxiety at manageable levels  Description: INTERVENTIONS:  - Administer medication as ordered  - Teach and encourage coping skills  - Provide emotional support  - Assess patient/family for anxiety and ability to cope  Outcome: Progressing     Problem: SUBSTANCE USE/ABUSE  Goal: Will have no detox symptoms and will verbalize plan for changing substance-related behavior  Description: INTERVENTIONS:  - Monitor physical status and assess for symptoms of withdrawal  - Administer medication as ordered  - Provide emotional support with 1 on 1 interaction with staff  - Encourage recovery focused program/ addiction education  - Assess for verbalization of changing behaviors related to substance abuse  - Initiate consults and referrals as appropriate (Case Management, Spiritual Care, etc )  Outcome: Progressing     Problem: DEPRESSION  Goal: Will be euthymic at discharge  Description: INTERVENTIONS:  - Administer medication as ordered  - Provide emotional support via 1:1 interaction with staff  - Encourage involvement in milieu/groups/activities  - Monitor for social isolation  Outcome: Not Progressing     Problem: ANXIETY  Goal: By discharge: Patient will verbalize 2 strategies to deal with anxiety  Description: Interventions:  - Identify any obvious source/trigger to anxiety  - Staff will assist patient in applying identified coping technique/skills  - Encourage attendance of scheduled groups and activities  Outcome: Not Progressing     Problem: SUBSTANCE USE/ABUSE  Goal: By discharge, will develop insight into their chemical dependency and sustain motivation to continue in recovery  Description: INTERVENTIONS:  - Attends all daily group sessions and scheduled AA groups  - Actively practices coping skills through participation in the therapeutic community and adherence to program rules  - Reviews and completes assignments from individual treatment plan  - Assist patient development of understanding of their personal cycle of addiction and relapse triggers  Outcome: Not Progressing  Goal: By discharge, patient will have ongoing treatment plan addressing chemical dependency  Description: INTERVENTIONS:  - Assist patient with resources and/or appointments for ongoing recovery based living  Outcome: Not Progressing

## 2022-02-18 NOTE — ED NOTES
Pt dozing intervals   Arouses easily and cooperative with staff at this time     Rosa Elena Magaña, SASCHA  02/18/22 0749
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
## 2022-02-18 NOTE — ED NOTES
Report called to ramila christensen at [facility] behavioral health  [facility] also made aware meds in biohazard bag destroyed due to could not identify same       Adriana Tony RN  02/18/22 4702
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
## 2022-02-18 NOTE — ED NOTES
Patient currently on the phone with Jacy from [address]        Dalia Barksdale, SASCHA  02/18/22 1471

## 2022-02-18 NOTE — ED NOTES
Found two types of pills in patient's shoes  Placed in bio bag in medication room        Omar Veras RN  02/18/22 2092
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
## 2022-02-18 NOTE — ED NOTES
Patient is accepted at [facility]  Patient is accepted by Romana Prose, PA-C per Morgan Sims  Transportation is arranged with [employer]  Transportation is scheduled for 1330  Patient may go to the floor before 1500  Nurse report is to be called to [phone] prior to patient transfer      Crisis logged the transport with Willa brown [facility]

## 2022-02-18 NOTE — ED NOTES
Patient OOB again looking out doors and looking around room        Yevgeniy Stewart RN  02/18/22 5695
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
## 2022-02-18 NOTE — ED NOTES
[facility] crisis contacted for crisis consult  Will call back        Tennille Perla RN  02/18/22 6012

## 2022-02-19 PROBLEM — F31.5 BIPOLAR I DISORDER, CURRENT OR MOST RECENT EPISODE DEPRESSED, WITH PSYCHOTIC FEATURES (HCC): Chronic | Status: ACTIVE | Noted: 2022-02-19

## 2022-02-19 PROBLEM — F31.5 BIPOLAR I DISORDER, CURRENT OR MOST RECENT EPISODE DEPRESSED, WITH PSYCHOTIC FEATURES (HCC): Status: ACTIVE | Noted: 2022-02-19

## 2022-02-19 PROCEDURE — 99222 1ST HOSP IP/OBS MODERATE 55: CPT | Performed by: PSYCHIATRY & NEUROLOGY

## 2022-02-19 RX ORDER — HYDROXYZINE HYDROCHLORIDE 25 MG/1
25 TABLET, FILM COATED ORAL
Status: DISCONTINUED | OUTPATIENT
Start: 2022-02-19 | End: 2022-02-25 | Stop reason: HOSPADM

## 2022-02-19 RX ORDER — HYDROXYZINE HYDROCHLORIDE 25 MG/1
25 TABLET, FILM COATED ORAL
Status: DISCONTINUED | OUTPATIENT
Start: 2022-02-19 | End: 2022-02-25

## 2022-02-19 RX ORDER — BUPROPION HYDROCHLORIDE 150 MG/1
150 TABLET ORAL DAILY
Status: DISCONTINUED | OUTPATIENT
Start: 2022-02-20 | End: 2022-02-25 | Stop reason: HOSPADM

## 2022-02-19 RX ORDER — FOLIC ACID 1 MG/1
1 TABLET ORAL DAILY
Status: DISCONTINUED | OUTPATIENT
Start: 2022-02-19 | End: 2022-02-25 | Stop reason: HOSPADM

## 2022-02-19 RX ORDER — POTASSIUM CHLORIDE 20 MEQ/1
40 TABLET, EXTENDED RELEASE ORAL DAILY
Status: COMPLETED | OUTPATIENT
Start: 2022-02-19 | End: 2022-02-23

## 2022-02-19 RX ORDER — PALIPERIDONE 3 MG/1
3 TABLET, EXTENDED RELEASE ORAL DAILY
Status: DISCONTINUED | OUTPATIENT
Start: 2022-02-19 | End: 2022-02-21

## 2022-02-19 RX ORDER — LANOLIN ALCOHOL/MO/W.PET/CERES
100 CREAM (GRAM) TOPICAL DAILY
Status: DISCONTINUED | OUTPATIENT
Start: 2022-02-19 | End: 2022-02-25 | Stop reason: HOSPADM

## 2022-02-19 RX ADMIN — Medication 3 MG: at 21:05

## 2022-02-19 RX ADMIN — LORAZEPAM 1 MG: 1 TABLET ORAL at 20:14

## 2022-02-19 RX ADMIN — BICTEGRAVIR SODIUM, EMTRICITABINE, AND TENOFOVIR ALAFENAMIDE FUMARATE 1 TABLET: 50; 200; 25 TABLET ORAL at 08:30

## 2022-02-19 RX ADMIN — Medication 1 TABLET: at 14:35

## 2022-02-19 RX ADMIN — LIDOCAINE 2 PATCH: 50 PATCH TOPICAL at 08:34

## 2022-02-19 RX ADMIN — HYDROXYZINE HYDROCHLORIDE 25 MG: 25 TABLET, FILM COATED ORAL at 21:06

## 2022-02-19 RX ADMIN — PALIPERIDONE 3 MG: 3 TABLET, EXTENDED RELEASE ORAL at 10:03

## 2022-02-19 RX ADMIN — AMLODIPINE BESYLATE 5 MG: 5 TABLET ORAL at 08:29

## 2022-02-19 RX ADMIN — BUPROPION HYDROCHLORIDE 150 MG: 75 TABLET, FILM COATED ORAL at 08:29

## 2022-02-19 RX ADMIN — HYDROXYZINE HYDROCHLORIDE 50 MG: 50 TABLET, FILM COATED ORAL at 14:36

## 2022-02-19 RX ADMIN — FOLIC ACID 1 MG: 1 TABLET ORAL at 14:35

## 2022-02-19 RX ADMIN — NICOTINE 1 PATCH: 14 PATCH, EXTENDED RELEASE TRANSDERMAL at 08:34

## 2022-02-19 RX ADMIN — THIAMINE HCL TAB 100 MG 100 MG: 100 TAB at 14:35

## 2022-02-19 RX ADMIN — POTASSIUM CHLORIDE 40 MEQ: 1500 TABLET, EXTENDED RELEASE ORAL at 14:35

## 2022-02-19 NOTE — NURSING NOTE
Message: Pt has home medications to be sent to pharmacy   Placed in pharmacy return bin in sealed bag #[id number]   Sent: [phone] by Francia Chowdhury RN

## 2022-02-19 NOTE — PLAN OF CARE
Problem: Ineffective Coping  Goal: Participates in unit activities  Description: Interventions:  - Provide therapeutic environment   - Provide required programming   - Redirect inappropriate behaviors   Outcome: Not Progressing     Problem: ANXIETY  Goal: Will report anxiety at manageable levels  Description: INTERVENTIONS:  - Administer medication as ordered  - Teach and encourage coping skills  - Provide emotional support  - Assess patient/family for anxiety and ability to cope  Outcome: Not Progressing     Problem: SUBSTANCE USE/ABUSE  Goal: Will have no detox symptoms and will verbalize plan for changing substance-related behavior  Description: INTERVENTIONS:  - Monitor physical status and assess for symptoms of withdrawal  - Administer medication as ordered  - Provide emotional support with 1 on 1 interaction with staff  - Encourage recovery focused program/ addiction education  - Assess for verbalization of changing behaviors related to substance abuse  - Initiate consults and referrals as appropriate (Case Management, Spiritual Care, etc )  Outcome: Progressing

## 2022-02-19 NOTE — PROGRESS NOTES
Progress Note - Joe Hudson 54 y o  male MRN: [id number]    Unit/Bed#: [facility] 254-02 Encounter: [id number]        Subjective:   Patient seen and examined at bedside after reviewing the chart and discussing the case with the caring staff  Patient examined at bedside  Patient has no acute issues  Patient's labs including CMP, vitamin-D and vitamin B12 levels are still pending  Physical Exam   Vitals: Blood pressure 117/82, pulse 79, temperature 98 7 °F (37 1 °C), temperature source Temporal, resp  rate 18, height 5' 8" (1 727 m), weight 69 2 kg (152 lb 8 oz), SpO2 97 %  ,Body mass index is 23 19 kg/m²  Constitutional: He appears well-developed  HEENT: PERR, EOMI, MMM  Cardiovascular: Normal rate and regular rhythm  Pulmonary/Chest: Effort normal and breath sounds normal    Abdomen: Soft, + BS, NT    Assessment/Plan:  Joe Hudson is a(n) 54 y o  male with MDD      1  Hypertension  Patient is on amlodipine 5 mg daily  2  Tobacco abuse  Patient may use nicotine transdermal patch 14 mg/24 hr   3  Alcohol abuse  I will put the patient on thiamine, folic acid and multivitamin  4  Arthralgias/low back pain  Patient may take Tylenol as needed, lidocaine patch daily to hip and low back  5  HIV disease  Patient is taking Biktarvy -25 mg daily  6  Insomnia  Patient is taking melatonin 3 mg daily  7  COURTNEY  Patient's creatinine was elevated at 1 94 mg/dL  He received IV fluids in the ED  Repeat CMP in the morning  8  Hypokalemia  Potassium level on 02/18/2022 was low at 3 3 mmol/L  Repeat CMP in the morning

## 2022-02-19 NOTE — NURSING NOTE
Patient visible in dayroom  Pleasant and cooperative, Bright on approach  Patient social with staff and peers  Patient denies SI,HI,AVH, anxiety or depression  Safety checks Q 7 minutes continue

## 2022-02-19 NOTE — PROGRESS NOTES
Patient came to [facility] with belongings present  This T inventoried patient's belongings with patient present  Patient and this T signed off on belongings on a personal belongings checklist  Belongings listed below:    Belongings to remain with patient:    1x pair of shoes (laces removed)  1x underwear  1x pants  1x t-shirt      Belongings in contraband storage:    1x lighter  1x gold chain  1x pair of shoe laces  1x zip-up sweater    1x wallet  1x PA ID  1x SSN card  1x Birth Certificate  Various Coins/Change      5x unmarked pills with 9883 on them  2x unmarked [place] pills with 525 on them (both these medications given to nursing staff in a sealed medication bag   Both this T and nursing staff signed the bag)
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
## 2022-02-19 NOTE — NURSING NOTE
Patient compliant with meds and meals  At this time patient denies everything  Patient is calm and cooperative but has an irritable edge  Patient is visible and social on the unit  Q 7 min behavioral and safety checks in place

## 2022-02-19 NOTE — TREATMENT PLAN
TREATMENT PLAN REVIEW - [facility] Ashwin Talia 54 y o  1970 male MRN: [id number]    [facility] [address] Room / Bed: [place] [address]/[facility] 436[address]73 Encounter: [id number]          Admit Date/Time:  2/18/2022  2:03 PM    Treatment Team: Attending Provider: Mick Feldman MD; Consulting Physician: Mylene Lynch MD; Patient Care Technician: Alexandra Quiroga; Patient Care Assistant: Eli Guzmán;  Patient Care Assistant: Valdez Gómez; Patient Care Assistant: Angy Loaiza; Registered Nurse: Charleen Fernando    Diagnosis: Principal Problem:    Bipolar I disorder, current or most recent episode depressed, with psychotic features [facility]      Patient Strengths/Assets: ability for insight, adapts well, average or above intelligence, cooperative, communication skills, general fund of knowledge, motivated, motivation for treatment/growth, negotiates basic needs, patient is on a voluntary commitment, patient is willing to work on problems, special hobby/interest, work skills    Patient Barriers/Limitations: financial instability, homeless, lack of financial means, lack of social/family support, limited education, limited family ties, limited support system, low self esteem, poor past treatment response, substance abuse    Short Term Goals: decrease in depressive symptoms, decrease in anxiety symptoms, decrease in paranoid thoughts, decrease in psychotic symptoms, ability to stay safe on the unit, ability to stay free of restraints, improvement in insight, mood stabilization, increase in socialization with peers on the unit, acceptance of need for psychiatric treatment, acceptance of psychiatric medications    Long Term Goals: improvement in depression, improvement in anxiety, stabilization of mood, resolution of psychotic symptoms, improved insight, acceptance of need for psychiatric medications, acceptance of need for psychiatric treatment, acceptance of need for psychiatric follow up after discharge, acceptance of psychiatric diagnosis, appropriate interaction with peers, appropriate interaction with family, stable living arrangements upon discharge    Progress Towards Goals: Patient is new to inpatient unit    Recommended Treatment: medication management, patient medication education, group therapy, milieu therapy, continued Behavioral Health psychiatric evaluation/assessment process    Treatment Frequency: daily medication monitoring, group and milieu therapy daily, monitoring through interdisciplinary rounds, monitoring through weekly patient care conferences    Expected Discharge Date:  10 days    Discharge Plan: placement in alternative living arrangement, referral for outpatient medication management with a psychiatrist, referrals as indicated    Treatment Plan Created/Updated By: Anahi Luke DO

## 2022-02-20 PROCEDURE — 99232 SBSQ HOSP IP/OBS MODERATE 35: CPT | Performed by: PSYCHIATRY & NEUROLOGY

## 2022-02-20 RX ORDER — LIDOCAINE 50 MG/G
2 PATCH TOPICAL DAILY
Status: DISCONTINUED | OUTPATIENT
Start: 2022-02-20 | End: 2022-02-21

## 2022-02-20 RX ADMIN — Medication 1 TABLET: at 14:01

## 2022-02-20 RX ADMIN — NICOTINE POLACRILEX 2 MG: 2 GUM, CHEWING BUCCAL at 20:43

## 2022-02-20 RX ADMIN — AMLODIPINE BESYLATE 5 MG: 5 TABLET ORAL at 08:35

## 2022-02-20 RX ADMIN — LIDOCAINE 2 PATCH: 50 PATCH TOPICAL at 20:44

## 2022-02-20 RX ADMIN — Medication 3 MG: at 20:43

## 2022-02-20 RX ADMIN — PALIPERIDONE 3 MG: 3 TABLET, EXTENDED RELEASE ORAL at 08:35

## 2022-02-20 RX ADMIN — POTASSIUM CHLORIDE 40 MEQ: 1500 TABLET, EXTENDED RELEASE ORAL at 08:35

## 2022-02-20 RX ADMIN — NICOTINE 1 PATCH: 14 PATCH, EXTENDED RELEASE TRANSDERMAL at 08:35

## 2022-02-20 RX ADMIN — HYDROXYZINE HYDROCHLORIDE 25 MG: 25 TABLET, FILM COATED ORAL at 20:43

## 2022-02-20 RX ADMIN — BUPROPION 150 MG: 150 TABLET, EXTENDED RELEASE ORAL at 08:35

## 2022-02-20 RX ADMIN — THIAMINE HCL TAB 100 MG 100 MG: 100 TAB at 08:35

## 2022-02-20 RX ADMIN — BICTEGRAVIR SODIUM, EMTRICITABINE, AND TENOFOVIR ALAFENAMIDE FUMARATE 1 TABLET: 50; 200; 25 TABLET ORAL at 08:42

## 2022-02-20 RX ADMIN — FOLIC ACID 1 MG: 1 TABLET ORAL at 08:35

## 2022-02-20 NOTE — PLAN OF CARE
Problem: ANXIETY  Goal: By discharge: Patient will verbalize 2 strategies to deal with anxiety  Description: Interventions:  - Identify any obvious source/trigger to anxiety  - Staff will assist patient in applying identified coping technique/skills  - Encourage attendance of scheduled groups and activities  Outcome: Progressing

## 2022-02-20 NOTE — NURSING NOTE
Patient compliant with meds and melas  Patient denies everything, social with peers and visible on the unit  Patient requesting lidocaine patches at [facility] states he feels they work better at night time when he is sleeping, put note into medical  Patient is pleasant and cooperative  Q 7 min behavioral and safety checks in place

## 2022-02-20 NOTE — NURSING NOTE
Pt  Is alert and oriented X4  Seclusive to room at times  Pt  Reports he feels depressed bu no more then average  Pt  Reports severe anxiety and feeling tense  Dyson anxiety scale score of 23-pt  Given ativan 1mg PO for severe anxiety  Denies SI/HI  Appears paranoid but denies auditory and visual hallucinations

## 2022-02-20 NOTE — PROGRESS NOTES
Advance Care Planning   Ambulatory ACP Specialist Patient Outreach    Date:  12/16/2021  ACP Specialist:  Lucrecia Sargent    Outreach call to patient in follow-up to ACP Specialist referral from: Marilyn Drummond MD    [x] PCP  [] Provider   [] Ambulatory Care Management [] Other for Reason:    [x] Advance Directive Assistance  [] Code Status Discussion  [] Complete Portable DNR Order  [] Discuss Goals of Care  [] Complete POST/MOST  [] Early ACP Decision-Making  [] Other    Date Referral Received: 12/16/2021    Today's Outreach:  [] First   [x] Second  [] Third                               Third outreach made by []  phone  [] email []   [email or web address]     Intervention:  [] Spoke with Patient  [x] Left VM requesting return call      Outcome: Left voice message requesting return call. Next Step:   [x] ACP scheduled conversation  [] Outreach again in one week               [] Email / Mail ACP Info Sheets  [] Email / Mail Advance Directive            [] Close Referral. Routing closure to referring provider/staff and to ACP Specialist .      Thank you for this referral. Progress Note - Ana Campbell 54 y o  male MRN: [id number]    Unit/Bed#: [facility] 254-02 Encounter: [id number]        Subjective:   Patient seen and examined at bedside after reviewing the chart and discussing the case with the caring staff  Patient examined at bedside  Patient has no acute issues  Patient is requesting that if his Lidoderm patch could be put on at night instead of in the morning  Patient's labs including CMP, vitamin-D and vitamin B12 levels are still pending  Physical Exam   Vitals: Blood pressure 117/65, pulse 80, temperature 98 6 °F (37 °C), temperature source Temporal, resp  rate 18, height 5' 8" (1 727 m), weight 69 2 kg (152 lb 8 oz), SpO2 98 %  ,Body mass index is 23 19 kg/m²  Constitutional: He appears well-developed  HEENT: PERR, EOMI, MMM  Cardiovascular: Normal rate and regular rhythm  Pulmonary/Chest: Effort normal and breath sounds normal    Abdomen: Soft, + BS, NT    Assessment/Plan:  Ana Campbell is a(n) 54 y o  male with MDD      1  Hypertension  Patient is on amlodipine 5 mg daily  2  Tobacco abuse  Patient may use nicotine transdermal patch 14 mg/24 hr   3  Alcohol abuse  I will put the patient on thiamine, folic acid and multivitamin  4  Arthralgias/low back pain  Patient may take Tylenol as needed, Lidocaine patch daily at 9:00 p m  to hip and low back  5  HIV disease  Patient is taking Biktarvy -25 mg daily  6  Insomnia  Patient is taking melatonin 3 mg daily  7  COURTNEY  Patient's creatinine was elevated at 1 94 mg/dL  He received IV fluids in the ED  Repeat CMP in the morning  8  Hypokalemia  Potassium level on 02/18/2022 was low at 3 3 mmol/L  Repeat CMP in the morning

## 2022-02-20 NOTE — PROGRESS NOTES
Progress Note - Behavioral Health     Nelida Goodpasture 54 y o  male MRN: [id number]   Unit/Bed#: [facility] 741-32 Encounter: [id number]    Behavior over the last 24 hours:  Unchanged    Marena Schaumann was seen in follow-up for continuation of care  Per report, patient has been more visible on the unit and social with peers  No reported acute events noted overnight  Medication meal compliant  Yesterday was complaining of severe anxiety and did take Ativan 1 mg p o  for this  Did make an attempt to visit patient on 3 separate occasions today however was uncooperative with interview and would not come out from under the covers since he was laying down in bed  Per staff, patient continues to exhibit some mild irritable edge  Per interview yesterday, patient was reluctant to give up Wellbutrin since this has been helpful in controlling his mood however did start Invega and recommended titration over the next few days with possible transition to long-acting injectable  Otherwise will continue to monitor mood, behavior and safety    Sleep: normal  Appetite: normal  Medication side effects: No   ROS: no complaints, all other systems are negative    Mental Status Evaluation:    Appearance:   In bed, under the covers   Behavior:  Uncooperative   Speech:  Mute   Mood:  Unable to assess   Affect:  Unable to assess   Thought Process:  Unable to assess   Associations: Unable to assess   Thought Content:  Unable to assess   Perceptual Disturbances: Unable to assess   Risk Potential: Suicidal ideation - unable to assess, however has been denying this to nursing staff  Homicidal ideation - unable to assess however has been denying this to nursing staff  Potential for aggression - not currently    Sensorium:  does not answer   Memory:  patient does not answer   Consciousness:  Patient was sleeping   Attention/Concentration: attention span and concentration: unable to assess due to lack of cooperation   Insight:  poor   Judgment: poor Gait/Station: in bed   Motor Activity: no abnormal movements     Vital signs in last 24 hours:    Temp:  [97 5 °F (36 4 °C)-98 7 °F (37 1 °C)] 97 5 °F (36 4 °C)  HR:  [72-73] 73  Resp:  [18] 18  BP: (129)/(84-88) 129/88    Laboratory results: I have personally reviewed all pertinent laboratory/tests results    Most Recent Labs:   Lab Results   Component Value Date    WBC 6 08 02/18/2022    RBC 4 98 02/18/2022    HGB 15 1 02/18/2022    HCT 43 2 02/18/2022     02/18/2022    RDW 12 7 02/18/2022    NEUTROABS 3 56 02/18/2022    SODIUM 138 02/18/2022    K 3 3 (L) 02/18/2022     02/18/2022    CO2 24 02/18/2022    BUN 12 02/18/2022    CREATININE 1 94 (H) 02/18/2022    GLUC 94 02/18/2022    CALCIUM 9 9 02/18/2022       Progress Toward Goals: Unchanged    Assessment/Plan   Principal Problem:    Bipolar I disorder, current or most recent episode depressed, with psychotic features ([facility] [address] )      Recommended Treatment:     Planned medication and treatment changes:     All current active medications have been reviewed  Encourage group therapy, milieu therapy and occupational therapy  Behavioral Health checks every 7 minutes  Continue current medications and therapy    Current Facility-Administered Medications   Medication Dose Route Frequency Provider Last Rate    acetaminophen  650 mg Oral Q6H PRN Gael Perez PA-C      acetaminophen  650 mg Oral Q4H PRN Donald Shen PA-C      acetaminophen  975 mg Oral Q6H PRN Donald Rodriguez PA-C      aluminum-magnesium hydroxide-simethicone  30 mL Oral Q4H PRN Donald Shen PA-C      amLODIPine  5 mg Oral Daily Donald Shen PA-C      artificial tear  1 application Both Eyes G5R PRN Donald Shen PA-C      haloperidol lactate  2 5 mg Intramuscular Q6H PRN Max 4/day Donald [place], [place]      And    LORazepam  1 mg Intramuscular Q6H PRN Max 4/day Donald [place], [place]      And    benztropine  0 5 mg Intramuscular Q6H PRN Max 4/day Uriah Emmanuel PA-C      haloperidol lactate  5 mg Intramuscular Q4H PRN Max 4/day [place], [place]      And    LORazepam  2 mg Intramuscular Q4H PRN Max 4/day [place]      And    benztropine  1 mg Intramuscular Q4H PRN Max 4/day [place]      benztropine  1 mg Intramuscular Q4H PRN Max 6/day [place]      benztropine  1 mg Oral Q4H PRN Max 6/day [place]      bictegravir-emtricitab-tenofovir alafenamide  1 tablet Oral Daily With Breakfast [place], [place]      bisacodyl  10 mg Rectal Daily PRN [facility], PA-      buPROPion  150 mg Oral Daily Miguel Lakhani,       folic acid  1 mg Oral Daily Mikie Lawrence MD      haloperidol  2 mg Oral Q4H PRN Max 6/day [facility]      haloperidol  5 mg Oral Q6H PRN Max 4/day [place]      haloperidol  5 mg Oral Q4H PRN Max 4/day [place]      hydrOXYzine HCL  25 mg Oral HS Miguel Lakhani,       hydrOXYzine HCL  25 mg Oral Q6H PRN Max 4/day Miguel Lakhani,       hydrOXYzine HCL  50 mg Oral Q4H PRN Max 4/day [facility]      Or    LORazepam  1 mg Intramuscular Q4H PRN Uriah Emmanuel PA-C      lidocaine  2 patch Topical Daily Pat Mena PA-C      LORazepam  1 mg Oral Q4H PRN Max 6/day [facility]      Or    LORazepam  2 mg Intramuscular Q6H PRN Max 3/day Uriah Emmanuel PA-C      melatonin  3 mg Oral HS [facility], ZANDER      multivitamin-minerals  1 tablet Oral Daily Mikie Lawrence MD      nicotine  1 patch Transdermal Daily [place], [place]      nicotine polacrilex  2 mg Oral Q2H PRN [place], [place]      paliperidone  3 mg Oral Daily Miguel Lakhani DO      polyethylene glycol  17 g Oral Daily PRN Amarilys Rodriguez PA-C      potassium chloride  40 mEq Oral Daily Mikie Lawrence MD      senna-docusate sodium  1 tablet Oral Daily PRN Elle Shen PA-C      thiamine  100 mg Oral Daily Harika Tena MD      traZODone  50 mg Oral HS PRN Nadine Talley PA-C       Risks / Benefits of Treatment:    Risks, benefits, and possible side effects of medications explained to patient and patient verbalizes understanding and agreement for treatment  Counseling / Coordination of Care: Total floor / unit time spent today 25 minutes  Greater than 50% of total time was spent with the patient and / or family counseling and / or coordination of care  A description of counseling / coordination of care:  Patient's progress discussed with staff in treatment team meeting  Medications, treatment progress and treatment plan reviewed with patient      Nadine Talley PA-C 02/20/22

## 2022-02-21 LAB
25(OH)D3 SERPL-MCNC: 20.8 NG/ML (ref 30–100)
ALBUMIN SERPL BCP-MCNC: 3.8 G/DL (ref 3.5–5)
ALP SERPL-CCNC: 49 U/L (ref 34–104)
ALT SERPL W P-5'-P-CCNC: 20 U/L (ref 7–52)
ANION GAP SERPL CALCULATED.3IONS-SCNC: 2 MMOL/L (ref 4–13)
AST SERPL W P-5'-P-CCNC: 20 U/L (ref 13–39)
BASOPHILS # BLD AUTO: 0.02 THOUSANDS/ΜL (ref 0–0.1)
BASOPHILS NFR BLD AUTO: 1 % (ref 0–1)
BILIRUB SERPL-MCNC: 0.37 MG/DL (ref 0.2–1)
BUN SERPL-MCNC: 13 MG/DL (ref 5–25)
CALCIUM SERPL-MCNC: 9 MG/DL (ref 8.4–10.2)
CHLORIDE SERPL-SCNC: 106 MMOL/L (ref 96–108)
CHOLEST SERPL-MCNC: 145 MG/DL
CO2 SERPL-SCNC: 30 MMOL/L (ref 21–32)
CREAT SERPL-MCNC: 1.47 MG/DL (ref 0.6–1.3)
EOSINOPHIL # BLD AUTO: 0.12 THOUSAND/ΜL (ref 0–0.61)
EOSINOPHIL NFR BLD AUTO: 4 % (ref 0–6)
ERYTHROCYTE [DISTWIDTH] IN BLOOD BY AUTOMATED COUNT: 12.6 % (ref 11.6–15.1)
GFR SERPL CREATININE-BSD FRML MDRD: 55 ML/MIN/1.73SQ M
GLUCOSE P FAST SERPL-MCNC: 94 MG/DL (ref 65–99)
GLUCOSE SERPL-MCNC: 94 MG/DL (ref 65–140)
HCT VFR BLD AUTO: 41.3 % (ref 36.5–49.3)
HDLC SERPL-MCNC: 47 MG/DL
HGB BLD-MCNC: 13.2 G/DL (ref 12–17)
IMM GRANULOCYTES # BLD AUTO: 0 THOUSAND/UL (ref 0–0.2)
IMM GRANULOCYTES NFR BLD AUTO: 0 % (ref 0–2)
LDLC SERPL CALC-MCNC: 78 MG/DL (ref 0–100)
LYMPHOCYTES # BLD AUTO: 1.25 THOUSANDS/ΜL (ref 0.6–4.47)
LYMPHOCYTES NFR BLD AUTO: 43 % (ref 14–44)
MCH RBC QN AUTO: 29.7 PG (ref 26.8–34.3)
MCHC RBC AUTO-ENTMCNC: 32 G/DL (ref 31.4–37.4)
MCV RBC AUTO: 93 FL (ref 82–98)
MONOCYTES # BLD AUTO: 0.31 THOUSAND/ΜL (ref 0.17–1.22)
MONOCYTES NFR BLD AUTO: 11 % (ref 4–12)
NEUTROPHILS # BLD AUTO: 1.16 THOUSANDS/ΜL (ref 1.85–7.62)
NEUTS SEG NFR BLD AUTO: 41 % (ref 43–75)
NONHDLC SERPL-MCNC: 98 MG/DL
NRBC BLD AUTO-RTO: 0 /100 WBCS
PLATELET # BLD AUTO: 290 THOUSANDS/UL (ref 149–390)
PMV BLD AUTO: 10.2 FL (ref 8.9–12.7)
POTASSIUM SERPL-SCNC: 4.2 MMOL/L (ref 3.5–5.3)
PROT SERPL-MCNC: 6.5 G/DL (ref 6.4–8.4)
RBC # BLD AUTO: 4.45 MILLION/UL (ref 3.88–5.62)
RPR SER QL: NORMAL
SODIUM SERPL-SCNC: 138 MMOL/L (ref 135–147)
TRIGL SERPL-MCNC: 98 MG/DL
WBC # BLD AUTO: 2.86 THOUSAND/UL (ref 4.31–10.16)

## 2022-02-21 PROCEDURE — 99233 SBSQ HOSP IP/OBS HIGH 50: CPT | Performed by: NURSE PRACTITIONER

## 2022-02-21 PROCEDURE — 85025 COMPLETE CBC W/AUTO DIFF WBC: CPT | Performed by: PSYCHIATRY & NEUROLOGY

## 2022-02-21 PROCEDURE — 80053 COMPREHEN METABOLIC PANEL: CPT | Performed by: PSYCHIATRY & NEUROLOGY

## 2022-02-21 PROCEDURE — 80061 LIPID PANEL: CPT | Performed by: PSYCHIATRY & NEUROLOGY

## 2022-02-21 PROCEDURE — 86592 SYPHILIS TEST NON-TREP QUAL: CPT | Performed by: PSYCHIATRY & NEUROLOGY

## 2022-02-21 PROCEDURE — 82306 VITAMIN D 25 HYDROXY: CPT | Performed by: FAMILY MEDICINE

## 2022-02-21 RX ORDER — LIDOCAINE 50 MG/G
2 PATCH TOPICAL
Status: DISCONTINUED | OUTPATIENT
Start: 2022-02-21 | End: 2022-02-25 | Stop reason: HOSPADM

## 2022-02-21 RX ORDER — MIRTAZAPINE 15 MG/1
7.5 TABLET, FILM COATED ORAL
Status: DISCONTINUED | OUTPATIENT
Start: 2022-02-21 | End: 2022-02-25 | Stop reason: HOSPADM

## 2022-02-21 RX ORDER — PALIPERIDONE 6 MG/1
6 TABLET, EXTENDED RELEASE ORAL DAILY
Status: DISCONTINUED | OUTPATIENT
Start: 2022-02-22 | End: 2022-02-25 | Stop reason: HOSPADM

## 2022-02-21 RX ADMIN — HYDROXYZINE HYDROCHLORIDE 25 MG: 25 TABLET, FILM COATED ORAL at 21:03

## 2022-02-21 RX ADMIN — HYDROXYZINE HYDROCHLORIDE 50 MG: 50 TABLET, FILM COATED ORAL at 17:47

## 2022-02-21 RX ADMIN — TRAZODONE HYDROCHLORIDE 50 MG: 50 TABLET ORAL at 00:09

## 2022-02-21 RX ADMIN — BUPROPION 150 MG: 150 TABLET, EXTENDED RELEASE ORAL at 08:10

## 2022-02-21 RX ADMIN — FOLIC ACID 1 MG: 1 TABLET ORAL at 08:10

## 2022-02-21 RX ADMIN — POTASSIUM CHLORIDE 40 MEQ: 1500 TABLET, EXTENDED RELEASE ORAL at 08:10

## 2022-02-21 RX ADMIN — AMLODIPINE BESYLATE 5 MG: 5 TABLET ORAL at 08:10

## 2022-02-21 RX ADMIN — THIAMINE HCL TAB 100 MG 100 MG: 100 TAB at 08:10

## 2022-02-21 RX ADMIN — BICTEGRAVIR SODIUM, EMTRICITABINE, AND TENOFOVIR ALAFENAMIDE FUMARATE 1 TABLET: 50; 200; 25 TABLET ORAL at 08:09

## 2022-02-21 RX ADMIN — PALIPERIDONE 3 MG: 3 TABLET, EXTENDED RELEASE ORAL at 08:10

## 2022-02-21 RX ADMIN — Medication 1 TABLET: at 13:24

## 2022-02-21 RX ADMIN — NICOTINE 1 PATCH: 14 PATCH, EXTENDED RELEASE TRANSDERMAL at 08:11

## 2022-02-21 NOTE — PLAN OF CARE
Problem: Ineffective Coping  Goal: Participates in unit activities  Description: Interventions:  - Provide therapeutic environment   - Provide required programming   - Redirect inappropriate behaviors   Outcome: Progressing     Problem: DEPRESSION  Goal: Will be euthymic at discharge  Description: INTERVENTIONS:  - Administer medication as ordered  - Provide emotional support via 1:1 interaction with staff  - Encourage involvement in milieu/groups/activities  - Monitor for social isolation  Outcome: Progressing     Problem: ANXIETY  Goal: Will report anxiety at manageable levels  Description: INTERVENTIONS:  - Administer medication as ordered  - Teach and encourage coping skills  - Provide emotional support  - Assess patient/family for anxiety and ability to cope  Outcome: Progressing  Goal: By discharge: Patient will verbalize 2 strategies to deal with anxiety  Description: Interventions:  - Identify any obvious source/trigger to anxiety  - Staff will assist patient in applying identified coping technique/skills  - Encourage attendance of scheduled groups and activities  Outcome: Progressing     Problem: SUBSTANCE USE/ABUSE  Goal: Will have no detox symptoms and will verbalize plan for changing substance-related behavior  Description: INTERVENTIONS:  - Monitor physical status and assess for symptoms of withdrawal  - Administer medication as ordered  - Provide emotional support with 1 on 1 interaction with staff  - Encourage recovery focused program/ addiction education  - Assess for verbalization of changing behaviors related to substance abuse  - Initiate consults and referrals as appropriate (Case Management, Spiritual Care, etc )  Outcome: Progressing  Goal: By discharge, will develop insight into their chemical dependency and sustain motivation to continue in recovery  Description: INTERVENTIONS:  - Attends all daily group sessions and scheduled AA groups  - Actively practices coping skills through participation in the therapeutic community and adherence to program rules  - Reviews and completes assignments from individual treatment plan  - Assist patient development of understanding of their personal cycle of addiction and relapse triggers  Outcome: Progressing  Goal: By discharge, patient will have ongoing treatment plan addressing chemical dependency  Description: INTERVENTIONS:  - Assist patient with resources and/or appointments for ongoing recovery based living  Outcome: Progressing

## 2022-02-21 NOTE — PLAN OF CARE
Problem: Ineffective Coping  Goal: Participates in unit activities  Description: Interventions:  - Provide therapeutic environment   - Provide required programming   - Redirect inappropriate behaviors   Outcome: Progressing     Group Goals: Building and Identifying Community Supports     Attendance:  2/3     Participation: PT has been attending at least 50% or more of offered art therapy groups  PT displays an appropriate mood and affect and has been having some positive social interactions with select peers  PT also was compliant with AT assessment and relapse prevention planning        Mood/Affect: Pleasant, cooperative     Behaviors/ Redirection: N/A

## 2022-02-21 NOTE — PROGRESS NOTES
02/21/22 0946   Team Meeting   Meeting Type Daily Rounds   Team Members Present   Team Members Present Physician;Nurse;; Other (Discipline and Name)   Physician Team Member Ирина Brooks   Nursing Team Member [facility]   Social Work Team Member Filiberto   Other (Discipline and Name) [facility])   Patient/Family Present   Patient Present No   Patient's Family Present No     New admission, [age], ETOH abuse, homeless, history of incarceration  Psychosis, non-compliance with medications and treatment

## 2022-02-21 NOTE — PROGRESS NOTES
02/21/22 1300   Activity/Group Checklist   Group   ([employer] AT)   Attendance Did not attend; Refused  (AT group offered   PT elected to not attend  )

## 2022-02-21 NOTE — NURSING NOTE
Pt was withdrawn to his room much of the shift  Patient was cooperative but superficial with conversation  Pt endorses anxiety and depression but denies SI/HI/AVH  Support offered  Q 7 minute checks were maintain

## 2022-02-21 NOTE — NURSING NOTE
Trazodone effective  Patient observed asleep or resting for the rest of the night without issue/concern  Non-labored breathing noted  No signs or symptoms of distress noted  Will remain on safety precautions and continual monitoring

## 2022-02-21 NOTE — PROGRESS NOTES
02/21/22 1225   Team Meeting   Meeting Type Daily Rounds   Team Members Present   Team Members Present Physician;Nurse;   Physician Team Member [id number] Team Member Nagi Kaur   Social Work Team Member Jose Angel   Patient/Family Present   Patient Present Yes   Patient's Family Present No   Treatment plan reviewed with patient  He is in agreement with treatment plan

## 2022-02-21 NOTE — NURSING NOTE
Pt received PRN Atarax for a purcell of 18  Pt would not specify what he was anxious about he just stated, " this place stresses me out " Will monitor for effectiveness

## 2022-02-21 NOTE — PLAN OF CARE
Problem: Ineffective Coping  Goal: Participates in unit activities  Description: Interventions:  - Provide therapeutic environment   - Provide required programming   - Redirect inappropriate behaviors   Outcome: Progressing     Problem: DEPRESSION  Goal: Will be euthymic at discharge  Description: INTERVENTIONS:  - Administer medication as ordered  - Provide emotional support via 1:1 interaction with staff  - Encourage involvement in milieu/groups/activities  - Monitor for social isolation  Outcome: Progressing     Problem: ANXIETY  Goal: Will report anxiety at manageable levels  Description: INTERVENTIONS:  - Administer medication as ordered  - Teach and encourage coping skills  - Provide emotional support  - Assess patient/family for anxiety and ability to cope  Outcome: Progressing  Goal: By discharge: Patient will verbalize 2 strategies to deal with anxiety  Description: Interventions:  - Identify any obvious source/trigger to anxiety  - Staff will assist patient in applying identified coping technique/skills  - Encourage attendance of scheduled groups and activities  Outcome: Progressing     Problem: SUBSTANCE USE/ABUSE  Goal: Will have no detox symptoms and will verbalize plan for changing substance-related behavior  Description: INTERVENTIONS:  - Monitor physical status and assess for symptoms of withdrawal  - Administer medication as ordered  - Provide emotional support with 1 on 1 interaction with staff  - Encourage recovery focused program/ addiction education  - Assess for verbalization of changing behaviors related to substance abuse  - Initiate consults and referrals as appropriate (Case Management, Spiritual Care, etc )  Outcome: Progressing  Goal: By discharge, will develop insight into their chemical dependency and sustain motivation to continue in recovery  Description: INTERVENTIONS:  - Attends all daily group sessions and scheduled AA groups  - Actively practices coping skills through participation in the therapeutic community and adherence to program rules  - Reviews and completes assignments from individual treatment plan  - Assist patient development of understanding of their personal cycle of addiction and relapse triggers  Outcome: Progressing  Goal: By discharge, patient will have ongoing treatment plan addressing chemical dependency  Description: INTERVENTIONS:  - Assist patient with resources and/or appointments for ongoing recovery based living  Outcome: Progressing     Problem: DISCHARGE PLANNING  Goal: Discharge to home or other facility with appropriate resources  Description: INTERVENTIONS:  - Identify barriers to discharge w/patient and caregiver  - Arrange for needed discharge resources and transportation as appropriate  - Identify discharge learning needs (meds, wound care, etc )  - Refer to Case Management Department for coordinating discharge planning if the patient needs post-hospital services based on physician/advanced practitioner order or complex needs related to functional status, cognitive ability, or social support system  Outcome: Progressing
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
## 2022-02-21 NOTE — PROGRESS NOTES
02/21/22 1030   Activity/Group Checklist   Group   (Community Building and Art Therapy Processing)   Attendance Attended   Attendance Duration (min) 16-30   Interactions Interacted appropriately   Affect/Mood Appropriate;Calm   Goals Achieved Identified feelings; Discussed coping strategies; Able to listen to others; Able to engage in interactions

## 2022-02-21 NOTE — PROGRESS NOTES
02/21/22 0701   Activity/Group Checklist   Group   (Pt check in with coffee/ covid control for unit)   Attendance Did not attend  (AT group offered, PT elected to remain in room)

## 2022-02-21 NOTE — NURSING NOTE
Pt was visible on the unit  The pt is cooperative but has a irritable edge  He is superficial with his answers  He endorses anxiety and depression but denies SI/HI/AVH  He is somewhat paranoid  Q 7 minute checks maintained  Will continue to monitor

## 2022-02-21 NOTE — PROGRESS NOTES
Progress Note - Mike Blanca 54 y o  male MRN: [id number]    Unit/Bed#: [facility] 254-02 Encounter: [id number]        Subjective:   Patient seen and examined at bedside after reviewing the chart and discussing the case with the caring staff  Patient examined at bedside  Patient has no acute issues  Patient's CMP from today 02/21/2022 showed hypokalemia resolved with potassium level 4 2   labs including vitamin-D and vitamin B12 levels are still pending  Physical Exam   Vitals: Blood pressure 117/65, pulse 80, temperature 98 6 °F (37 °C), temperature source Temporal, resp  rate 18, height 5' 8" (1 727 m), weight 69 2 kg (152 lb 8 oz), SpO2 98 %  ,Body mass index is 23 19 kg/m²  Constitutional: He appears well-developed  HEENT: PERR, EOMI, MMM  Cardiovascular: Normal rate and regular rhythm  Pulmonary/Chest: Effort normal and breath sounds normal    Abdomen: Soft, + BS, NT    Assessment/Plan:  Mike Blnaca is a(n) 54 y o  male with MDD      1  Hypertension  Patient is on amlodipine 5 mg daily  2  Tobacco abuse  Patient may use nicotine transdermal patch 14 mg/24 hr   3  Alcohol abuse  I will put the patient on thiamine, folic acid and multivitamin  4  Arthralgias/low back pain  Patient may take Tylenol as needed, Lidocaine patch daily at 9:00 p m  to hip and low back  5  HIV disease  Patient is taking Biktarvy -25 mg daily  6  Insomnia  Patient is taking melatonin 3 mg daily  7  COURTNEY  slightly improved on today's CMP 02/21/2022 with a BUN 13 and creatinine 1 47 and GFR 55 as compared to 12, 1 94 and 39 respectively from 02/18/2022    8  Hypokalemia  Seemingly resolved on today's labs 02/21/2022 with potassium 4 2

## 2022-02-22 PROCEDURE — 99232 SBSQ HOSP IP/OBS MODERATE 35: CPT | Performed by: PSYCHIATRY & NEUROLOGY

## 2022-02-22 RX ORDER — MELATONIN
1000 DAILY
Status: DISCONTINUED | OUTPATIENT
Start: 2022-04-26 | End: 2022-02-25 | Stop reason: HOSPADM

## 2022-02-22 RX ORDER — ERGOCALCIFEROL 1.25 MG/1
50000 CAPSULE ORAL WEEKLY
Status: DISCONTINUED | OUTPATIENT
Start: 2022-02-22 | End: 2022-02-25 | Stop reason: HOSPADM

## 2022-02-22 RX ADMIN — LIDOCAINE 2 PATCH: 50 PATCH CUTANEOUS at 21:47

## 2022-02-22 RX ADMIN — HYDROXYZINE HYDROCHLORIDE 25 MG: 25 TABLET, FILM COATED ORAL at 21:47

## 2022-02-22 RX ADMIN — FOLIC ACID 1 MG: 1 TABLET ORAL at 08:26

## 2022-02-22 RX ADMIN — NICOTINE 1 PATCH: 14 PATCH, EXTENDED RELEASE TRANSDERMAL at 11:30

## 2022-02-22 RX ADMIN — ACETAMINOPHEN 975 MG: 325 TABLET ORAL at 18:35

## 2022-02-22 RX ADMIN — BUPROPION 150 MG: 150 TABLET, EXTENDED RELEASE ORAL at 08:26

## 2022-02-22 RX ADMIN — MIRTAZAPINE 7.5 MG: 15 TABLET, FILM COATED ORAL at 00:15

## 2022-02-22 RX ADMIN — BICTEGRAVIR SODIUM, EMTRICITABINE, AND TENOFOVIR ALAFENAMIDE FUMARATE 1 TABLET: 50; 200; 25 TABLET ORAL at 08:25

## 2022-02-22 RX ADMIN — THIAMINE HCL TAB 100 MG 100 MG: 100 TAB at 08:26

## 2022-02-22 RX ADMIN — POTASSIUM CHLORIDE 40 MEQ: 1500 TABLET, EXTENDED RELEASE ORAL at 08:26

## 2022-02-22 RX ADMIN — HYDROXYZINE HYDROCHLORIDE 50 MG: 50 TABLET, FILM COATED ORAL at 17:14

## 2022-02-22 RX ADMIN — Medication 1 TABLET: at 12:26

## 2022-02-22 RX ADMIN — PALIPERIDONE 6 MG: 6 TABLET, EXTENDED RELEASE ORAL at 08:25

## 2022-02-22 RX ADMIN — ERGOCALCIFEROL 50000 UNITS: 1.25 CAPSULE ORAL at 11:30

## 2022-02-22 RX ADMIN — AMLODIPINE BESYLATE 5 MG: 5 TABLET ORAL at 08:26

## 2022-02-22 NOTE — NURSING NOTE
Remeron effective  Patient observed asleep for the rest of the shift  Will remain on safety precautions and continual monitoring

## 2022-02-22 NOTE — NURSING NOTE
Patient reporting trouble sleeping  Remeron 7 5 given as ordered  Will remain on safety precautions and continual monitoring

## 2022-02-22 NOTE — PROGRESS NOTES
02/22/22 0730   Activity/Group Checklist   Group   (Pt check in with coffee/ covid control for unit)   Attendance Attended   Attendance Duration (min) 16-30   Interactions Interacted appropriately   Affect/Mood Appropriate;Calm   Goals Achieved Identified feelings; Discussed coping strategies; Increased hopefulness; Able to listen to others; Able to engage in interactions; Able to manage/cope with feelings

## 2022-02-22 NOTE — NURSING NOTE
Brenden [place] was observed within the milieu  Pt was calm and cooperative  Pt is able to make needs known  Pt c/o anxiety but denies depression  Pt denies SI/HI/AVH  Support offered  Will continue to monitor

## 2022-02-22 NOTE — PROGRESS NOTES
02/22/22 1030   Activity/Group Checklist   Group   ([employer]/ Symbol[employer] Art Therapy Processing)   Attendance Did not attend  (AT group offered, PT elected to remain in room)

## 2022-02-22 NOTE — NURSING NOTE
Patient seen in his room reading his book  Slightly irritable edge noted  Asked patient if something was wrong and while evasive at first did share that he felt like his wanting to discharge to inpatient rehab wasn't being "heard"  Patient reassured that it will be passed along, along with his want to return to [facility]  He denies si/hi and hallucinations  And while initially endorsing back pain and wanting Lidoderm patches, he politely changed his mind and stated "not tonight"  No other needs identified  Will remain on safety precautions and continual monitoring

## 2022-02-22 NOTE — PROGRESS NOTES
02/22/22 1400   Activity/Group Checklist   Group   (Decorative [employer]/ Symbolism Art Therapy Processing)   Attendance Did not attend; Refused  (AT group offered   PT remained in room )

## 2022-02-22 NOTE — PROGRESS NOTES
Progress Note - Hortencia Calderon 54 y o  male MRN: [id number]    Unit/Bed#: [place] [address]-01 Encounter: [id number]        Subjective:   Patient seen and examined at bedside after reviewing the chart and discussing the case with the caring staff  Patient examined at bedside  Patient has no acute issues  Patient's CMP from today 02/21/2022 showed hypokalemia resolved with potassium level 4 2  Patient's vitamin-D level was found to be low 20 8 but B12 level is still pending  Physical Exam   Vitals: Blood pressure 117/84, pulse 73, temperature 99 °F (37 2 °C), temperature source Temporal, resp  rate 18, height 5' 8" (1 727 m), weight 69 2 kg (152 lb 8 oz), SpO2 98 %  ,Body mass index is 23 19 kg/m²  Constitutional: He appears well-developed  HEENT: PERR, EOMI, MMM  Cardiovascular: Normal rate and regular rhythm  Pulmonary/Chest: Effort normal and breath sounds normal    Abdomen: Soft, + BS, NT    Assessment/Plan:  Hortencia Calderon is a(n) 54 y o  male with MDD      1  Hypertension  Patient is on amlodipine 5 mg daily  2  Tobacco abuse  Patient may use nicotine transdermal patch 14 mg/24 hr   3  Alcohol abuse  I will put the patient on thiamine, folic acid and multivitamin  4  Arthralgias/low back pain  Patient may take Tylenol as needed, Lidocaine patch daily at 9:00 p m  to hip and low back  5  HIV disease  Patient is taking Biktarvy -25 mg daily  6  Insomnia  Patient is taking melatonin 3 mg daily  7  COURTNEY  slightly improved on today's CMP 02/21/2022 with a BUN 13 and creatinine 1 47 and GFR 55 as compared to 12, 1 94 and 39 respectively from 02/18/2022    8  New vitamin-D deficiency  I will put the patient on vitamin D bolus doses for 10 weeks followed by vitamin D3 1000 units daily

## 2022-02-23 PROCEDURE — 99232 SBSQ HOSP IP/OBS MODERATE 35: CPT | Performed by: NURSE PRACTITIONER

## 2022-02-23 RX ORDER — METHOCARBAMOL 500 MG/1
500 TABLET, FILM COATED ORAL EVERY 8 HOURS PRN
Status: DISCONTINUED | OUTPATIENT
Start: 2022-02-23 | End: 2022-02-25 | Stop reason: HOSPADM

## 2022-02-23 RX ADMIN — BUPROPION 150 MG: 150 TABLET, EXTENDED RELEASE ORAL at 08:22

## 2022-02-23 RX ADMIN — PALIPERIDONE 6 MG: 6 TABLET, EXTENDED RELEASE ORAL at 08:22

## 2022-02-23 RX ADMIN — METHOCARBAMOL 500 MG: 500 TABLET ORAL at 12:35

## 2022-02-23 RX ADMIN — HYDROXYZINE HYDROCHLORIDE 50 MG: 50 TABLET, FILM COATED ORAL at 20:18

## 2022-02-23 RX ADMIN — FOLIC ACID 1 MG: 1 TABLET ORAL at 08:22

## 2022-02-23 RX ADMIN — AMLODIPINE BESYLATE 5 MG: 5 TABLET ORAL at 08:22

## 2022-02-23 RX ADMIN — MIRTAZAPINE 7.5 MG: 15 TABLET, FILM COATED ORAL at 20:21

## 2022-02-23 RX ADMIN — HYDROXYZINE HYDROCHLORIDE 50 MG: 50 TABLET, FILM COATED ORAL at 17:24

## 2022-02-23 RX ADMIN — NICOTINE 1 PATCH: 14 PATCH, EXTENDED RELEASE TRANSDERMAL at 08:22

## 2022-02-23 RX ADMIN — POTASSIUM CHLORIDE 40 MEQ: 1500 TABLET, EXTENDED RELEASE ORAL at 08:22

## 2022-02-23 RX ADMIN — THIAMINE HCL TAB 100 MG 100 MG: 100 TAB at 08:22

## 2022-02-23 RX ADMIN — METHOCARBAMOL 500 MG: 500 TABLET ORAL at 20:18

## 2022-02-23 RX ADMIN — Medication 1 TABLET: at 12:03

## 2022-02-23 RX ADMIN — BICTEGRAVIR SODIUM, EMTRICITABINE, AND TENOFOVIR ALAFENAMIDE FUMARATE 1 TABLET: 50; 200; 25 TABLET ORAL at 08:28

## 2022-02-23 NOTE — PROGRESS NOTES
02/23/22 0770   Activity/Group Checklist   Group   (Pt check in with coffee/ covid control for unit)   Attendance Did not attend  (AT group offered, PT sleeping)

## 2022-02-23 NOTE — NURSING NOTE
Fiorella Balaji was observed isolative to himself  Pt is calm and cooperative  PT is guarded and paranoid  Pt is withdrawn to self  Pt denies anxiety, depression, SI/HI/AVH  Support offered  Will continue to monitor

## 2022-02-23 NOTE — TREATMENT TEAM
02/23/22 1030   Activity/Group Checklist   Group   (Self [employer] Art Therapy )   Attendance Did not attend

## 2022-02-23 NOTE — PROGRESS NOTES
Progress Note - Nomi Cazares 54 y o  male MRN: [id number]    Unit/Bed#: [facility] 247-01 Encounter: [id number]        Subjective:   Patient seen and examined at bedside after reviewing the chart and discussing the case with the caring staff  Patient examined at bedside  Patient has no acute issues  Patient is requesting muscle relaxant for his pains as they work good for him  Physical Exam   Vitals: Blood pressure 123/75, pulse 69, temperature 98 1 °F (36 7 °C), temperature source Temporal, resp  rate 16, height 5' 8" (1 727 m), weight 69 2 kg (152 lb 8 oz), SpO2 99 %  ,Body mass index is 23 19 kg/m²  Constitutional: He appears well-developed  HEENT: PERR, EOMI, MMM  Cardiovascular: Normal rate and regular rhythm  Pulmonary/Chest: Effort normal and breath sounds normal    Abdomen: Soft, + BS, NT    Assessment/Plan:  Nomi Cazares is a(n) 54 y o  male with MDD      1  Hypertension  Patient is on amlodipine 5 mg daily  2  Tobacco abuse  Patient may use nicotine transdermal patch 14 mg/24 hr   3  Alcohol abuse  I will put the patient on thiamine, folic acid and multivitamin  4  Arthralgias/low back pain  Patient may take Tylenol as needed, Lidocaine patch daily at 9:00 p m  to hip and low back  5  HIV disease  Patient is taking Biktarvy -25 mg daily  6  Insomnia  Patient is taking melatonin 3 mg daily  7  COURTNEY  slightly improved on today's CMP 02/21/2022 with a BUN 13 and creatinine 1 47 and GFR 55 as compared to 12, 1 94 and 39 respectively from 02/18/2022    8  New vitamin-D deficiency  I will put the patient on vitamin D bolus doses for 10 weeks followed by vitamin D3 1000 units daily  9  DJD/osteoarthritis with muscle spasm  I will put the patient on Robaxin 500 mg every 8 hours on as-needed basis along with Tylenol

## 2022-02-23 NOTE — PROGRESS NOTES
Progress Note - Behavioral Health   Cyndi Vivas 54 y o  male MRN: @MRN   Unit/Bed#: [facility] 247-01 Encounter: [id number]      Report from staff regarding this patient received and discussed, and records reviewed prior to seeing this patient  Behavior over the last 24 hours: unchanged  Patient denies symptoms of psychosis but stated he feels depressed but not suicidal       Patient remains calm, distracted and guarded today  Sleep: decreased  Appetite: fair  Medication side effects: No     Mental Status Evaluation:    Appearance:  dressed in hospital attire   Mood:  depressed, anxious   Affect: constricted    Speech:  decreased rate, slow, increased latency of response   Thought Content:  negative thinking, poverty of thought   Perceptual Disturbances: no auditory hallucinations, no visual hallucinations, denies auditory hallucinations when asked, does not appear responding to internal stimuli   Risk Potential: Suicidal ideation - None, contracts for safety on the unit  Homicidal ideation - None  Potential for aggression - No   Insight:  significantly impaired   Judgment: significantly impaired   Motor Activity: no abnormal movements         Laboratory results:  I have personally reviewed all pertinent laboratory results  Progress Toward Goals: no significant improvement    Assessment/Plan   Principal Problem:    Bipolar I disorder, current or most recent episode depressed, with psychotic features ([facility] [address] )    Recommended Treatment:  The patient medications were recently adjusted and the writer made a decision not to make any changes today but continue to follow the patient depression without suicidal thoughts  Planned medication and treatment changes: All current active medications have been reviewed  Continue treatment with group therapy, milieu therapy, occupational therapy and medication management  ** Please Note: This note has been constructed using a voice recognition system   **      BMP: Recent Labs     02/21/22  0629   K 4 2      CO2 30   BUN 13     Vitals:    02/22/22 1600   BP: 123/75   Pulse: 69   Resp: 16   Temp: 98 1 °F (36 7 °C)   SpO2: 99%        Medication Administration - last 24 hours from 02/21/2022 2153 to 02/22/2022 2153       Date/Time Order Dose Route Action Action by     02/22/2022 1714 hydrOXYzine HCL (ATARAX) tablet 50 mg 50 mg Oral Given Denice Sen RN     02/22/2022 1714 LORazepam (ATIVAN) injection 1 mg   Intramuscular See Alternative Denice Sen RN     02/22/2022 1130 nicotine (NICODERM CQ) 14 mg/24hr TD 24 hr patch 1 patch 1 patch Transdermal Medication Applied Denice Sen RN     02/22/2022 0900 nicotine (NICODERM CQ) 14 mg/24hr TD 24 hr patch 1 patch 1 patch Transdermal Patch Removed Suni Howe RN     02/22/2022 1835 acetaminophen (TYLENOL) tablet 975 mg 975 mg Oral Given Denice Sen RN     02/22/2022 0826 amLODIPine (NORVASC) tablet 5 mg 5 mg Oral Given Suni Howe, SASCHA     02/22/2022 0825 bictegravir-emtricitab-tenofovir alafenamide (BIKTARVY) -25 MG tablet 1 tablet 1 tablet Oral Given Suni Howe RN     02/22/2022 0826 buPROPion (WELLBUTRIN XL) 24 hr tablet 150 mg 150 mg Oral Given Suni Howe RN     02/22/2022 2147 hydrOXYzine HCL (ATARAX) tablet 25 mg 25 mg Oral Given Denice Sen RN     02/22/2022 1295 thiamine tablet 100 mg 100 mg Oral Given Suni Howe RN     86/68/9584 5899 folic acid (FOLVITE) tablet 1 mg 1 mg Oral Given Suni Howe RN     02/22/2022 1226 multivitamin-minerals (CENTRUM) tablet 1 tablet 1 tablet Oral Given Cassia Roper RN     02/22/2022 0826 potassium chloride (K-DUR,KLOR-CON) CR tablet 40 mEq 40 mEq Oral Given Suni Howe RN     02/22/2022 2147 lidocaine (LIDODERM) 5 % patch 2 patch 2 patch Topical Medication Applied Denice Sen RN     02/22/2022 0825 paliperidone (INVEGA) 24 hr tablet 6 mg 6 mg Oral Given Suni Howe RN     02/22/2022 0015 mirtazapine (REMERON) tablet 7 5 mg 7 5 mg Oral Given Charlie Greco RN     02/22/2022 1130 ergocalciferol (VITAMIN D2) capsule 50,000 Units 50,000 Units Oral Given Alize Webb RN

## 2022-02-23 NOTE — PROGRESS NOTES
Progress Note - Behavioral Health   Justice Euceda 54 y o  male MRN: [id number]  Unit/Bed#: U 247-01 Encounter: [id number]    Assessment/Plan   Principal Problem:    Bipolar I disorder, current or most recent episode depressed, with psychotic features ([address] )      Behavior over the last 24 hours:  Some improvement  Sleep: normal  Appetite: normal  Medication side effects: No  ROS: no complaints and all other systems are negative    Sandi Escoto was seen today for psychiatric follow-up  Describes mood as getting better and is significantly less irritable and agitated  Rates his depression as 4/10 and denies SI  Also endorses mild anxiety related to discharge planning  Continues to verbalize his want for inpatient drug and alcohol rehab  He has been compliant with medications and meals  Often keeps to himself and does not socialize with peers or attend groups  Patient encouraged to attend group today to which he is considering  Denies AVH/HI  Thoughts were linear and goal-directed with no delusional content verbalized  Mental Status Evaluation:  Appearance:  age appropriate, casually dressed and Laying in bed   Behavior:  Calm, cooperative   Speech:  normal pitch and normal volume   Mood:  Better   Affect:  mood-congruent   Thought Process:  goal directed and Linear   Thought Content:  No overt delusions or paranoia   Perceptual Disturbances: Denies AVH, does not appear internally preoccupied   Risk Potential: Suicidal Ideations none  Homicidal Ideations none  Potential for Aggression No   Sensorium:  person, place, time/date and situation   Memory:  recent and remote memory grossly intact   Consciousness:  alert and awake    Attention: attention span and concentration were age appropriate   Insight:  Limited, but improving   Judgment: Limited, but improving   Gait/Station: Laying in bed   Motor Activity: no abnormal movements     Progress Toward Goals:  Some improvement    Mood less agitated, less irritable  Invega increased to 6 mg PO QD starting yesterday 2/22/22; tolerating well  Plan is to switch to SAEZ (invega sustenna)  Continues to express interest for inpatient drug and alcohol rehab  Case management to follow up on referral submitted today  Recommended Treatment: Continue with group therapy, milieu therapy and occupational therapy  Risks, benefits and possible side effects of Medications:   Risks, benefits, and possible side effects of medications explained to patient and patient verbalizes understanding        Medications:   all current active meds have been reviewed, continue current psychiatric medications and current meds:   Current Facility-Administered Medications   Medication Dose Route Frequency    acetaminophen (TYLENOL) tablet 650 mg  650 mg Oral Q6H PRN    acetaminophen (TYLENOL) tablet 650 mg  650 mg Oral Q4H PRN    acetaminophen (TYLENOL) tablet 975 mg  975 mg Oral Q6H PRN    aluminum-magnesium hydroxide-simethicone (MYLANTA) oral suspension 30 mL  30 mL Oral Q4H PRN    amLODIPine (NORVASC) tablet 5 mg  5 mg Oral Daily    artificial tear (LUBRIFRESH P M ) ophthalmic ointment 1 application  1 application Both Eyes Z2V PRN    haloperidol lactate (HALDOL) injection 2 5 mg  2 5 mg Intramuscular Q6H PRN Max 4/day    And    LORazepam (ATIVAN) injection 1 mg  1 mg Intramuscular Q6H PRN Max 4/day    And    benztropine (COGENTIN) injection 0 5 mg  0 5 mg Intramuscular Q6H PRN Max 4/day    haloperidol lactate (HALDOL) injection 5 mg  5 mg Intramuscular Q4H PRN Max 4/day    And    LORazepam (ATIVAN) injection 2 mg  2 mg Intramuscular Q4H PRN Max 4/day    And    benztropine (COGENTIN) injection 1 mg  1 mg Intramuscular Q4H PRN Max 4/day    benztropine (COGENTIN) injection 1 mg  1 mg Intramuscular Q4H PRN Max 6/day    benztropine (COGENTIN) tablet 1 mg  1 mg Oral Q4H PRN Max 6/day    bictegravir-emtricitab-tenofovir alafenamide (BIKTARVY) -25 MG tablet 1 tablet  1 tablet Oral Daily With Breakfast    bisacodyl (DULCOLAX) rectal suppository 10 mg  10 mg Rectal Daily PRN    buPROPion (WELLBUTRIN XL) 24 hr tablet 150 mg  150 mg Oral Daily    [START ON 4/26/2022] cholecalciferol (VITAMIN D3) tablet 1,000 Units  1,000 Units Oral Daily    ergocalciferol (VITAMIN D2) capsule 50,000 Units  50,000 Units Oral Weekly    folic acid (FOLVITE) tablet 1 mg  1 mg Oral Daily    haloperidol (HALDOL) tablet 2 mg  2 mg Oral Q4H PRN Max 6/day    haloperidol (HALDOL) tablet 5 mg  5 mg Oral Q6H PRN Max 4/day    haloperidol (HALDOL) tablet 5 mg  5 mg Oral Q4H PRN Max 4/day    hydrOXYzine HCL (ATARAX) tablet 25 mg  25 mg Oral HS    hydrOXYzine HCL (ATARAX) tablet 25 mg  25 mg Oral Q6H PRN Max 4/day    hydrOXYzine HCL (ATARAX) tablet 50 mg  50 mg Oral Q4H PRN Max 4/day    Or    LORazepam (ATIVAN) injection 1 mg  1 mg Intramuscular Q4H PRN    lidocaine (LIDODERM) 5 % patch 2 patch  2 patch Topical HS    LORazepam (ATIVAN) tablet 1 mg  1 mg Oral Q4H PRN Max 6/day    Or    LORazepam (ATIVAN) injection 2 mg  2 mg Intramuscular Q6H PRN Max 3/day    mirtazapine (REMERON) tablet 7 5 mg  7 5 mg Oral HS PRN    multivitamin-minerals (CENTRUM) tablet 1 tablet  1 tablet Oral Daily    nicotine (NICODERM CQ) 14 mg/24hr TD 24 hr patch 1 patch  1 patch Transdermal Daily    nicotine polacrilex (NICORETTE) gum 2 mg  2 mg Oral Q2H PRN    paliperidone (INVEGA) 24 hr tablet 6 mg  6 mg Oral Daily    polyethylene glycol (MIRALAX) packet 17 g  17 g Oral Daily PRN    senna-docusate sodium (SENOKOT S) 8 6-50 mg per tablet 1 tablet  1 tablet Oral Daily PRN    thiamine tablet 100 mg  100 mg Oral Daily     Labs: I have personally reviewed all pertinent laboratory/tests results     CMP:   Lab Results   Component Value Date    SODIUM 138 02/21/2022    K 4 2 02/21/2022     02/21/2022    CO2 30 02/21/2022    AGAP 2 (L) 02/21/2022    BUN 13 02/21/2022    CREATININE 1 47 (H) 02/21/2022    GLUC 94 02/21/2022 GLUF 94 02/21/2022    CALCIUM 9 0 02/21/2022    AST 20 02/21/2022    ALT 20 02/21/2022    ALKPHOS 49 02/21/2022    TP 6 5 02/21/2022    ALB 3 8 02/21/2022    TBILI 0 37 02/21/2022    EGFR 55 02/21/2022     Counseling / Coordination of Care  Total floor / unit time spent today 30 minutes  Greater than 50% of total time was spent with the patient and / or family counseling and / or coordination of care   A description of the counseling / coordination of care:  Medication education, treatment plan, supportive therapy

## 2022-02-24 LAB
ALBUMIN SERPL BCP-MCNC: 3.8 G/DL (ref 3.5–5)
ALP SERPL-CCNC: 47 U/L (ref 34–104)
ALT SERPL W P-5'-P-CCNC: 12 U/L (ref 7–52)
ANION GAP SERPL CALCULATED.3IONS-SCNC: 4 MMOL/L (ref 4–13)
AST SERPL W P-5'-P-CCNC: 11 U/L (ref 13–39)
BILIRUB SERPL-MCNC: 0.33 MG/DL (ref 0.2–1)
BUN SERPL-MCNC: 16 MG/DL (ref 5–25)
CALCIUM SERPL-MCNC: 8.9 MG/DL (ref 8.4–10.2)
CHLORIDE SERPL-SCNC: 106 MMOL/L (ref 96–108)
CO2 SERPL-SCNC: 29 MMOL/L (ref 21–32)
CREAT SERPL-MCNC: 1.45 MG/DL (ref 0.6–1.3)
GFR SERPL CREATININE-BSD FRML MDRD: 56 ML/MIN/1.73SQ M
GLUCOSE SERPL-MCNC: 91 MG/DL (ref 65–140)
POTASSIUM SERPL-SCNC: 4.3 MMOL/L (ref 3.5–5.3)
PROT SERPL-MCNC: 6.4 G/DL (ref 6.4–8.4)
SODIUM SERPL-SCNC: 139 MMOL/L (ref 135–147)

## 2022-02-24 PROCEDURE — 80053 COMPREHEN METABOLIC PANEL: CPT | Performed by: FAMILY MEDICINE

## 2022-02-24 PROCEDURE — 99232 SBSQ HOSP IP/OBS MODERATE 35: CPT | Performed by: PSYCHIATRY & NEUROLOGY

## 2022-02-24 PROCEDURE — 93005 ELECTROCARDIOGRAM TRACING: CPT

## 2022-02-24 RX ORDER — QUETIAPINE FUMARATE 200 MG/1
200 TABLET, FILM COATED ORAL
Status: DISCONTINUED | OUTPATIENT
Start: 2022-02-24 | End: 2022-02-25 | Stop reason: HOSPADM

## 2022-02-24 RX ADMIN — BICTEGRAVIR SODIUM, EMTRICITABINE, AND TENOFOVIR ALAFENAMIDE FUMARATE 1 TABLET: 50; 200; 25 TABLET ORAL at 08:07

## 2022-02-24 RX ADMIN — LORAZEPAM 1 MG: 1 TABLET ORAL at 17:04

## 2022-02-24 RX ADMIN — FOLIC ACID 1 MG: 1 TABLET ORAL at 08:07

## 2022-02-24 RX ADMIN — BUPROPION 150 MG: 150 TABLET, EXTENDED RELEASE ORAL at 08:07

## 2022-02-24 RX ADMIN — METHOCARBAMOL 500 MG: 500 TABLET ORAL at 10:58

## 2022-02-24 RX ADMIN — LIDOCAINE 2 PATCH: 50 PATCH CUTANEOUS at 20:32

## 2022-02-24 RX ADMIN — ACETAMINOPHEN 650 MG: 325 TABLET ORAL at 17:03

## 2022-02-24 RX ADMIN — AMLODIPINE BESYLATE 5 MG: 5 TABLET ORAL at 08:07

## 2022-02-24 RX ADMIN — HALOPERIDOL 5 MG: 5 TABLET ORAL at 17:04

## 2022-02-24 RX ADMIN — PALIPERIDONE 6 MG: 6 TABLET, EXTENDED RELEASE ORAL at 08:07

## 2022-02-24 RX ADMIN — NICOTINE 1 PATCH: 14 PATCH, EXTENDED RELEASE TRANSDERMAL at 08:08

## 2022-02-24 RX ADMIN — METHOCARBAMOL 500 MG: 500 TABLET ORAL at 19:00

## 2022-02-24 RX ADMIN — PALIPERIDONE PALMITATE 234 MG: 234 INJECTION INTRAMUSCULAR at 12:06

## 2022-02-24 RX ADMIN — QUETIAPINE FUMARATE 200 MG: 200 TABLET ORAL at 20:30

## 2022-02-24 RX ADMIN — HYDROXYZINE HYDROCHLORIDE 25 MG: 25 TABLET, FILM COATED ORAL at 20:51

## 2022-02-24 RX ADMIN — THIAMINE HCL TAB 100 MG 100 MG: 100 TAB at 08:07

## 2022-02-24 RX ADMIN — BENZTROPINE MESYLATE 1 MG: 1 TABLET ORAL at 17:04

## 2022-02-24 RX ADMIN — Medication 1 TABLET: at 12:13

## 2022-02-24 NOTE — PROGRESS NOTES
02/24/22 0705   Activity/Group Checklist   Group   (Pt check in with coffee/ covid control for unit)   Attendance Did not attend  (AT group offered, PT elected to remain in room)

## 2022-02-24 NOTE — CASE MANAGEMENT
Case Management Assessment    Patient name Gilda Claudio  Location [facility] 247/[facility] 610-95 MRN [id number]  : 1970 Date 2022       Current Admission Date: 2022  Current Admission Diagnosis:Bipolar I disorder, current or most recent episode depressed, with psychotic features [facility])   Patient Active Problem List    Diagnosis Date Noted    Bipolar I disorder, current or most recent episode depressed, with psychotic features ([facility] [address] ) 2022      LOS (days): 6  Geometric Mean LOS (GMLOS) (days):   Days to GMLOS:     OBJECTIVE:    Risk of Unplanned Readmission Score: 13         Current admission status: Inpatient Psych  Referral Reason: Psych    Preferred Pharmacy: No Pharmacies Listed  Primary Care Provider: No primary care provider on file  Primary Insurance: [facility]  Secondary Insurance:     ASSESSMENT:  Active Health Care Agents    There are no active Health Care Agents on file  This writer met with patient and completed his Intake  Patient presents for paranoia and delusions  It is not known if patient was using illicit substances  Patient has history of Cocaine and ETOH Abuse  Patient has been diagnosed with mental illness but has not been compliant with medications and outpatient treatment  He reports conflict with his significant other  Her family found out he was HIV positive and he feared they were plotting to kill him  Patient fled the area and went to the hospital  He reports using Cocaine and ETOH in January  He is requesting inpatient rehab or rehab long term care in [facility]  He reports his intention to return to [place] for treatment  Patient would like to be referred to [facility] and any facilities in [place]     This writer called the following facilities    [facility] Cheo Bajwa due to [address] [facility]

## 2022-02-24 NOTE — NURSING NOTE
Pt received a PO HAC at 1707 for a purcell of 31 and an agitation score of 36  Pt was pacing the halls and very upset over discharge  Pt had a verbal outburst at staff  Pt attempted to go to his room and calm down but later came out asking for PRNs  Pt also received PRN Tylenol for back pain of a 6/10  Will Monitor for effectiveness

## 2022-02-24 NOTE — CASE MANAGEMENT
Case Management Progress Note    Patient name Leatha Zepeda  Location [facility] 247/[facility] 852-71 MRN [id number]  : 1970 Date 2022       LOS (days): 6  Geometric Mean LOS (GMLOS) (days):   Days to GMLOS:        OBJECTIVE:        Current admission status: Inpatient Psych  Preferred Pharmacy: No Pharmacies Listed  Primary Care Provider: No primary care provider on file  Primary Insurance: [facility]  Secondary Insurance:     PROGRESS NOTE:   This writer met with patient who reports he called [facility] and beds were available  This writer called   [facility]- Left voice message  [facility]- No Beds  [facility] No Beds  [address] [place] [address]- No beds  [facility]- Not in network with insurance    Patient requested to meet with this writer  Patient states he has to be discharged today because he cannot obtain his paycheck if he doesn't go in person  Patient states he can obtain his rehab bed and he can stay with friends in the [place] [address] until he is able to obtain a rehab bed

## 2022-02-24 NOTE — PROGRESS NOTES
Progress Note - Amanda De La Fuente 54 y o  male MRN: [id number]    Unit/Bed#: [facility] 247-01 Encounter: [id number]        Subjective:   Patient seen and examined at bedside after reviewing the chart and discussing the case with the caring staff  Patient examined at bedside  Patient has no acute complaints  Physical Exam   Vitals: Blood pressure 119/70, pulse 73, temperature 98 5 °F (36 9 °C), temperature source Temporal, resp  rate 18, height 5' 8" (1 727 m), weight 69 2 kg (152 lb 8 oz), SpO2 100 %  ,Body mass index is 23 19 kg/m²  Constitutional:  Patient appears well-developed  HEENT: PERR, EOMI, MMM  Cardiovascular: Normal rate and regular rhythm  Pulmonary/Chest: Effort normal and breath sounds normal    Abdomen: Soft, + BS, NT    Assessment/Plan:  Amanda De La Fuente is a(n) 54 y o  male with MDD      1  Hypertension  Patient is on amlodipine 5 mg daily  2  Tobacco abuse  Patient may use nicotine transdermal patch 14 mg/24 hr   3  Alcohol abuse  Patient is on thiamine, folic acid and multivitamin  4  Arthralgias/low back pain  Patient may take Tylenol as needed, Lidocaine patch daily at 9:00 p m  to hip and low back  5  HIV disease  Patient is taking Biktarvy -25 mg daily  6  Insomnia  Patient is taking melatonin 3 mg daily  7  COURTNEY  Slightly improved on today's CMP 02/21/2022 with a BUN 13 and creatinine 1 47 and GFR 55 as compared to 12, 1 94 and 39 respectively from 02/18/2022    8  Vitamin-D deficiency  Patient started on vitamin D bolus doses for 10 weeks followed by vitamin D3 1000 units daily  9  DJD/osteoarthritis with muscle spasm  Patient is on Robaxin 500 mg every 8 hours on as-needed basis along with Tylenol  The patient was discussed with Dr Staci Beltrán and he is in agreement with the above note

## 2022-02-24 NOTE — PROGRESS NOTES
Progress Note - Behavioral Health   Nelida Goodpasture 54 y o  male MRN: [id number]  Unit/Bed#: U 247-01 Encounter: [id number]    Assessment/Plan   Principal Problem:    Bipolar I disorder, current or most recent episode depressed, with psychotic features ([address] )      Behavior over the last 24 hours:  unchanged  Sleep:  Broken  Appetite: normal  Medication side effects: No  ROS: no complaints and all other systems are negative    Marena Schaumann was seen today for psychiatric follow-up  Irritable edge noted, but able to maintain good mood control  Reports ongoing difficulty with sleep with Seroquel being the only effective medication that has helped him in the past for this issue  We discussed the risks vs benefits of adding a 2nd antipsychotic to his medication regimen to which patient verbalized understanding; he wanted to proceed with utilizing Seroquel for mood and sleep  He denies AVH/SI/HI  Continues to endorse mild anxiety regarding disposition planning  Verbalizes desire for inpatient rehab  He has been mostly withdrawn to his room and isolative to self  Compliant with medications and meals      Mental Status Evaluation:  Appearance:  age appropriate and casually dressed   Behavior:  cooperative, calm   Speech:  soft   Mood:  irritable   Affect:  mood-congruent   Thought Process:  goal directed and linear   Thought Content:  no overt delusions or paranoia   Perceptual Disturbances: Denies AVH, does not appear internally preoccupied   Risk Potential: Suicidal Ideations none  Homicidal Ideations none  Potential for Aggression No   Sensorium:  person, place, time/date and situation   Memory:  recent and remote memory grossly intact   Consciousness:  alert and awake    Attention: attention span and concentration were age appropriate   Insight:  Limited, but improving   Judgment: Limited, but improving   Gait/Station: normal gait/station and normal balance   Motor Activity: no abnormal movements     Progress Toward Goals: No change  Mood remains controlled with irritable edge  Continues to report difficulty sleeping  Will initiate Seroquel to 100 mg PO QHS for mood, depression associated with bipolar disorder, and sleep  EKG completed today to determine baseline QTC;400  Will receive Invega Sustenna 234 mg IM today followed by maintenance dose of 156 mg IM 3/3/22 and monthly thereafter  Patient in agreement with plan  CM to follow up on IP D&A referrals  Recommended Treatment: Continue with group therapy, milieu therapy and occupational therapy  Risks, benefits and possible side effects of Medications:   Risks, benefits, and possible side effects of medications explained to patient and patient verbalizes understanding        Medications:   all current active meds have been reviewed, current meds:   Current Facility-Administered Medications   Medication Dose Route Frequency    acetaminophen (TYLENOL) tablet 650 mg  650 mg Oral Q6H PRN    acetaminophen (TYLENOL) tablet 650 mg  650 mg Oral Q4H PRN    acetaminophen (TYLENOL) tablet 975 mg  975 mg Oral Q6H PRN    aluminum-magnesium hydroxide-simethicone (MYLANTA) oral suspension 30 mL  30 mL Oral Q4H PRN    amLODIPine (NORVASC) tablet 5 mg  5 mg Oral Daily    artificial tear (LUBRIFRESH P M ) ophthalmic ointment 1 application  1 application Both Eyes L8H PRN    haloperidol lactate (HALDOL) injection 2 5 mg  2 5 mg Intramuscular Q6H PRN Max 4/day    And    LORazepam (ATIVAN) injection 1 mg  1 mg Intramuscular Q6H PRN Max 4/day    And    benztropine (COGENTIN) injection 0 5 mg  0 5 mg Intramuscular Q6H PRN Max 4/day    haloperidol lactate (HALDOL) injection 5 mg  5 mg Intramuscular Q4H PRN Max 4/day    And    LORazepam (ATIVAN) injection 2 mg  2 mg Intramuscular Q4H PRN Max 4/day    And    benztropine (COGENTIN) injection 1 mg  1 mg Intramuscular Q4H PRN Max 4/day    benztropine (COGENTIN) injection 1 mg  1 mg Intramuscular Q4H PRN Max 6/day    benztropine (COGENTIN) tablet 1 mg  1 mg Oral Q4H PRN Max 6/day    bictegravir-emtricitab-tenofovir alafenamide (BIKTARVY) -25 MG tablet 1 tablet  1 tablet Oral Daily With Breakfast    bisacodyl (DULCOLAX) rectal suppository 10 mg  10 mg Rectal Daily PRN    buPROPion (WELLBUTRIN XL) 24 hr tablet 150 mg  150 mg Oral Daily    [START ON 4/26/2022] cholecalciferol (VITAMIN D3) tablet 1,000 Units  1,000 Units Oral Daily    ergocalciferol (VITAMIN D2) capsule 50,000 Units  50,000 Units Oral Weekly    folic acid (FOLVITE) tablet 1 mg  1 mg Oral Daily    haloperidol (HALDOL) tablet 2 mg  2 mg Oral Q4H PRN Max 6/day    haloperidol (HALDOL) tablet 5 mg  5 mg Oral Q6H PRN Max 4/day    haloperidol (HALDOL) tablet 5 mg  5 mg Oral Q4H PRN Max 4/day    hydrOXYzine HCL (ATARAX) tablet 25 mg  25 mg Oral HS    hydrOXYzine HCL (ATARAX) tablet 25 mg  25 mg Oral Q6H PRN Max 4/day    hydrOXYzine HCL (ATARAX) tablet 50 mg  50 mg Oral Q4H PRN Max 4/day    Or    LORazepam (ATIVAN) injection 1 mg  1 mg Intramuscular Q4H PRN    lidocaine (LIDODERM) 5 % patch 2 patch  2 patch Topical HS    LORazepam (ATIVAN) tablet 1 mg  1 mg Oral Q4H PRN Max 6/day    Or    LORazepam (ATIVAN) injection 2 mg  2 mg Intramuscular Q6H PRN Max 3/day    methocarbamol (ROBAXIN) tablet 500 mg  500 mg Oral Q8H PRN    mirtazapine (REMERON) tablet 7 5 mg  7 5 mg Oral HS PRN    multivitamin-minerals (CENTRUM) tablet 1 tablet  1 tablet Oral Daily    nicotine (NICODERM CQ) 14 mg/24hr TD 24 hr patch 1 patch  1 patch Transdermal Daily    nicotine polacrilex (NICORETTE) gum 2 mg  2 mg Oral Q2H PRN    paliperidone (INVEGA) 24 hr tablet 6 mg  6 mg Oral Daily    paliperidone palmitate ER (INVEGA) IM injection 234 mg  234 mg Intramuscular Once    polyethylene glycol (MIRALAX) packet 17 g  17 g Oral Daily PRN    QUEtiapine (SEROquel) tablet 200 mg  200 mg Oral HS    senna-docusate sodium (SENOKOT S) 8 6-50 mg per tablet 1 tablet  1 tablet Oral Daily PRN    thiamine tablet 100 mg  100 mg Oral Daily    and add Seroquel 200mg PO QHS, Start Invega Sustenna 234 mg IM x1 today, followed by Jacquelin Salinas Sustenna 156 mg IM 03/03/2022 and monthly thereafter  Labs: I have personally reviewed all pertinent laboratory/tests results  EKG completed 2/24/22 with QTc 400  CMP:   Lab Results   Component Value Date    SODIUM 139 02/24/2022    K 4 3 02/24/2022     02/24/2022    CO2 29 02/24/2022    AGAP 4 02/24/2022    BUN 16 02/24/2022    CREATININE 1 45 (H) 02/24/2022    GLUC 91 02/24/2022    GLUF 94 02/21/2022    CALCIUM 8 9 02/24/2022    AST 11 (L) 02/24/2022    ALT 12 02/24/2022    ALKPHOS 47 02/24/2022    TP 6 4 02/24/2022    ALB 3 8 02/24/2022    TBILI 0 33 02/24/2022    EGFR 56 02/24/2022      Counseling / Coordination of Care  Total floor / unit time spent today 30 minutes  Greater than 50% of total time was spent with the patient and / or family counseling and / or coordination of care   A description of the counseling / coordination of care:  Medication education, treatment plan, supportive therapy

## 2022-02-24 NOTE — PROGRESS NOTES
02/24/22 1030   Activity/Group Checklist   Group   ([employer])   Attendance Did not attend  (AT group offered, PT elected to remain in room)

## 2022-02-24 NOTE — NURSING NOTE
Patient visible at times on unit  Patient also withdrawn to room  Patient calm and cooperative  Patient denies SI,HI,AVH, and depression  Patient does c/o of anxiety of a 7/10  Patient requested his Robaxin and Remeron for sleep  Atarax 50 mg given for anxiety at 2018  Dyson score 19 with positive effects noted  Patient sleeping with no distress noted  Positive effect from Robaxin noted  Safety checks Q 7 minutes continue

## 2022-02-24 NOTE — PROGRESS NOTES
02/24/22 0908   Team Meeting   Meeting Type Daily Rounds   Team Members Present   Team Members Present Physician;Nurse;   Phys[place] Team [place]   Nursing Team Member [address] [facility] 200 Work Team Member [place] [address] [address]   Patient/Family Present   Patient Present No   Patient's Family Present No     Patient is taking his medications, more isolated, Patient will be given Invega injection

## 2022-02-25 VITALS
SYSTOLIC BLOOD PRESSURE: 119 MMHG | HEART RATE: 73 BPM | DIASTOLIC BLOOD PRESSURE: 70 MMHG | HEIGHT: 68 IN | BODY MASS INDEX: 23.11 KG/M2 | TEMPERATURE: 98.5 F | RESPIRATION RATE: 18 BRPM | WEIGHT: 152.5 LBS | OXYGEN SATURATION: 100 %

## 2022-02-25 PROBLEM — F31.5 BIPOLAR I DISORDER, CURRENT OR MOST RECENT EPISODE DEPRESSED, WITH PSYCHOTIC FEATURES (HCC): Chronic | Status: RESOLVED | Noted: 2022-02-19 | Resolved: 2022-02-25

## 2022-02-25 LAB
ATRIAL RATE: 66 BPM
ATRIAL RATE: 66 BPM
P AXIS: 67 DEGREES
P AXIS: 71 DEGREES
PR INTERVAL: 152 MS
PR INTERVAL: 154 MS
QRS AXIS: 42 DEGREES
QRS AXIS: 45 DEGREES
QRSD INTERVAL: 84 MS
QRSD INTERVAL: 86 MS
QT INTERVAL: 374 MS
QT INTERVAL: 382 MS
QTC INTERVAL: 392 MS
QTC INTERVAL: 400 MS
T WAVE AXIS: -20 DEGREES
T WAVE AXIS: -5 DEGREES
VENTRICULAR RATE: 66 BPM
VENTRICULAR RATE: 66 BPM

## 2022-02-25 PROCEDURE — 93010 ELECTROCARDIOGRAM REPORT: CPT | Performed by: INTERNAL MEDICINE

## 2022-02-25 PROCEDURE — 99238 HOSP IP/OBS DSCHRG MGMT 30/<: CPT | Performed by: NURSE PRACTITIONER

## 2022-02-25 RX ORDER — AMLODIPINE BESYLATE 5 MG/1
5 TABLET ORAL DAILY
Qty: 30 TABLET | Refills: 0 | Status: SHIPPED | OUTPATIENT
Start: 2022-02-26

## 2022-02-25 RX ORDER — PALIPERIDONE 6 MG/1
6 TABLET, EXTENDED RELEASE ORAL DAILY
Qty: 7 TABLET | Refills: 0 | Status: SHIPPED | OUTPATIENT
Start: 2022-02-26 | End: 2022-03-05

## 2022-02-25 RX ORDER — NICOTINE 21 MG/24HR
1 PATCH, TRANSDERMAL 24 HOURS TRANSDERMAL DAILY
Qty: 28 PATCH | Refills: 0 | Status: SHIPPED | OUTPATIENT
Start: 2022-02-26

## 2022-02-25 RX ORDER — QUETIAPINE FUMARATE 200 MG/1
200 TABLET, FILM COATED ORAL
Qty: 30 TABLET | Refills: 0 | Status: SHIPPED | OUTPATIENT
Start: 2022-02-25 | End: 2022-03-27

## 2022-02-25 RX ORDER — ERGOCALCIFEROL 1.25 MG/1
50000 CAPSULE ORAL WEEKLY
Qty: 9 CAPSULE | Refills: 0 | Status: SHIPPED | OUTPATIENT
Start: 2022-03-01 | End: 2022-04-27

## 2022-02-25 RX ORDER — LIDOCAINE 50 MG/G
2 PATCH TOPICAL
Qty: 30 PATCH | Refills: 0 | Status: SHIPPED | OUTPATIENT
Start: 2022-02-25

## 2022-02-25 RX ORDER — BUPROPION HYDROCHLORIDE 150 MG/1
150 TABLET ORAL DAILY
Qty: 30 TABLET | Refills: 0 | Status: SHIPPED | OUTPATIENT
Start: 2022-02-26 | End: 2022-03-28

## 2022-02-25 RX ORDER — METHOCARBAMOL 500 MG/1
500 TABLET, FILM COATED ORAL EVERY 8 HOURS PRN
Qty: 30 TABLET | Refills: 0 | Status: SHIPPED | OUTPATIENT
Start: 2022-02-25

## 2022-02-25 RX ORDER — FOLIC ACID 1 MG/1
1 TABLET ORAL DAILY
Qty: 30 TABLET | Refills: 0 | Status: SHIPPED | OUTPATIENT
Start: 2022-02-26

## 2022-02-25 RX ORDER — MELATONIN
1000 DAILY
Qty: 30 TABLET | Refills: 0 | Status: SHIPPED | OUTPATIENT
Start: 2022-04-26

## 2022-02-25 RX ORDER — THIAMINE MONONITRATE (VIT B1) 100 MG
100 TABLET ORAL DAILY
Qty: 30 TABLET | Refills: 0 | Status: SHIPPED | OUTPATIENT
Start: 2022-02-26

## 2022-02-25 RX ADMIN — FOLIC ACID 1 MG: 1 TABLET ORAL at 08:26

## 2022-02-25 RX ADMIN — BICTEGRAVIR SODIUM, EMTRICITABINE, AND TENOFOVIR ALAFENAMIDE FUMARATE 1 TABLET: 50; 200; 25 TABLET ORAL at 08:32

## 2022-02-25 RX ADMIN — BUPROPION 150 MG: 150 TABLET, EXTENDED RELEASE ORAL at 08:26

## 2022-02-25 RX ADMIN — PALIPERIDONE 6 MG: 6 TABLET, EXTENDED RELEASE ORAL at 08:26

## 2022-02-25 RX ADMIN — METHOCARBAMOL 500 MG: 500 TABLET ORAL at 09:54

## 2022-02-25 RX ADMIN — AMLODIPINE BESYLATE 5 MG: 5 TABLET ORAL at 08:26

## 2022-02-25 RX ADMIN — THIAMINE HCL TAB 100 MG 100 MG: 100 TAB at 08:26

## 2022-02-25 NOTE — CASE MANAGEMENT
Case Management Discharge Planning Note    Patient name Magno Pinedo  Location [facility] 247/[facility] 919-96 MRN [id number]  : 1970 Date 2022       Current Admission Date: 2022  Current Admission Diagnosis:Major depressive disorder, single episode, unspecified, Psychosis ([facility] [address] )   There are no problems to display for this patient  LOS (days): 7  Geometric Mean LOS (GMLOS) (days):   Days to GMLOS:     OBJECTIVE:  Risk of Unplanned Readmission Score: 16         Current admission status: Inpatient Psych   Preferred Pharmacy:   [place] [address] [place] [address] [address]ULIETTE/ Jamil Domínguez  [facility] [address]  Phone: [phone] Fax: [phone]    Primary Care Provider: No primary care provider on file  Primary Insurance: [facility]  Secondary Insurance:     DISCHARGE DETAILS:    Discharge planning discussed with[de-identified] Patient      This writer spoke with patient and informed him of status of his referrals for IP Rehab  Patient states he does not want to go to rehab today and requested to be discharged to his sister's care  He reports that he has to go to his job to obtain his paycheck  The patient signed an JAIRON for [facility] which is where he receives comprehensive services through their [employer]  This writer requested the patient's sister call this writer to discuss his discharge  This writer spoke with the patient's sister and she confirms patient will be transported by her  This writer scheduled patient's follow up appointment with his outpatient providers  Patient is being discharged, no SI/HI, no psychosis or A/V  Patient is in agreement with discharge  No questions verbalized  Patient provided with after care appointment, discharge instructions and prescriptions  IMM, quality core measures, transition of care, discharge instructions, and treatment plan complete  Patient will be transported by his sister    SW will continue to follow up as needed  Patient's discharge was faxed to outpatient providers                      Contacts  Patient Contacts: Daniel  Relationship to Patient[de-identified] Family  Contact Method: Phone  Phone Number: [phone]  Reason/Outcome: Discharge Planning              Other Referral/Resources/Interventions Provided:  Referrals Provided[de-identified] Other (Specify),Recovery Housing    Would you like to participate in our [facility] [address] service program?  : No - Declined

## 2022-02-25 NOTE — PROGRESS NOTES
Patient D/C Belongings:    In Room - shoes x1, underwear x1, pants x1, t-shirt x1    Storage - sweater zip up x1    Contraband - wallet x1, PA ID x1, SSN card x1, birth cert x1, lighter x1, gold chain x1    meds from pharmacy returned upon d/c

## 2022-02-25 NOTE — PROGRESS NOTES
02/25/22 1050   Team Meeting   Meeting Type Daily Rounds   Team Members Present   Team Members Present Physician;;Nurse   Physician Team Member DARIEN Brooks DR   Nursing Team Member [address] [facility] Work Team Member [place] [address]   Patient/Family Present   Patient Present No   Patient's Family Present No     Patient was agitated yesterday, signed his 72hrs notice, SAEZ administered  He will be discharged today  Family will transport

## 2022-02-25 NOTE — PROGRESS NOTES
02/25/22 1100   Activity/Group Checklist   Group   ([employer])   Attendance Did not attend  (AT Group offered, PT declined)
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
## 2022-02-25 NOTE — BH TRANSITION RECORD
Contact Information: If you have any questions, concerns, pended studies, tests and/or procedures, or emergencies regarding your inpatient behavioral health visit  Please contact [facility] behavioral health unit [phone] and ask to speak to a , nurse or physician  A contact is available 24 hours/ 7 days a week at this number  Summary of Procedures Performed During your Stay:  Below is a list of major procedures performed during your hospital stay and a summary of results:  - No major procedures performed  Pending Studies (From admission, onward)    None        If studies are pending at discharge, follow up with your PCP and/or referring provider

## 2022-02-25 NOTE — NURSING NOTE
Pt was visible on the unit  Pt received first injection of Invega  Pt was cooperative with an irritable edge  In the afternoon he received a phone call  After the phone call the pt became fixated on discharge  Pt was agitated and anxious pacing the halls waiting for the doctor to talk to him  Nursing staff tried to deescalate him and offer PRNs  Pt declined at first but returned later and asked for medication  PRN PO HAC was give at 1707  Pt reported it was effective  Pt was able to gain control of behaviors and mood  Q 7 minute checks were maintained  Will continue to monitor
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
## 2022-02-25 NOTE — NURSING NOTE
Administered methocarbamol 500 mg prn per patient request for generalized muscular spasms  Will continue to monitor

## 2022-02-25 NOTE — NURSING NOTE
Pt educated on all aspects of discharge and questions answered  Pt signed for belongings and given AVS along with home meds from pharmacy  Pt denies all SI, HI, AVH at this time  Writer walked pt to ride in [employer]

## 2022-02-25 NOTE — DISCHARGE INSTRUCTIONS
Bipolar Disorder   WHAT YOU NEED TO KNOW:   Bipolar disorder is a long-term chemical imbalance that causes rapid changes in mood and behavior  High moods are called jaleesa  Low moods are called depression  Sometimes you will feel manic and sometimes you will feel depressed  You can have alternating episodes of jaleesa and depression  This is called a mixed bipolar state  DISCHARGE INSTRUCTIONS:   Call 911 if:   · You think about hurting yourself or someone else  Contact your healthcare provider or psychiatrist if:   · You are having trouble managing your bipolar disorder  · You cannot sleep, or are sleeping all the time  · You cannot eat, or are eating more than usual     · You feel dizzy or your stomach is upset  · You cannot make it to your next meeting  · You have questions or concerns about your condition or care  Medicines:   · Medicines  may be given to help keep your mood stable, or to help you sleep  Changes in medicine are often needed as your bipolar disorder changes  · Take your medicine as directed  Contact your healthcare provider if you think your medicine is not helping or if you have side effects  Tell him or her if you are allergic to any medicine  Keep a list of the medicines, vitamins, and herbs you take  Include the amounts, and when and why you take them  Bring the list or the pill bottles to follow-up visits  Carry your medicine list with you in case of an emergency  Follow up with your healthcare provider or psychiatrist as directed:  Write down your questions so you remember to ask them during your visits  Manage bipolar disorder:  Watch for triggers of bipolar disorder symptoms, such as stress  Learn new ways to relax, such as deep breathing, to manage your stress  Tell someone if you feel a manic or depressive period might be coming on  Ask a friend or family member to help watch you for bipolar symptoms   Work to develop skills that will help you manage bipolar disorder  You may need to make lifestyle changes  Ask your healthcare provider or psychiatrist for resources  For support and more information:   · [address] ([address]), Office of [employer] Planning, and Communications  [address], [address] [address], [address] [place], [place] [address]   Phone: [phone]  Phone: [phone]  Web Address: Mary tn    · Depression and [address] ([address])  [address] [facility], [address] [place] [address] , [facility]  Phone: [phone]  Web Address: [facility] no  [address] [place] [address] 2022 Information is for End User's use only and may not be sold, redistributed or otherwise used for commercial purposes  All illustrations and images included in CareNotes® are the copyrighted property of [employer]  or [address] [place] [address]  The above information is an  only  It is not intended as medical advice for individual conditions or treatments  Talk to your doctor, nurse or pharmacist before following any medical regimen to see if it is safe and effective for you

## 2022-02-25 NOTE — DISCHARGE INSTR - APPOINTMENTS
Linn Swain RN, our [employer], will be calling you after your discharge, on the phone number that you provided  She will be available as an additional support, if needed  If you wish to speak with her, you may contact Juan David Rodríguez at [phone]

## 2022-02-25 NOTE — PROGRESS NOTES
Progress Note - Amanda De La Fuente 54 y o  male MRN: [id number]    Unit/Bed#: [facility] 247-01 Encounter: [id number]        Subjective:   Patient seen and examined at bedside after reviewing the chart and discussing the case with the caring staff  Patient examined at bedside  Patient has no acute complaints  Patient is being discharged today, Friday 02/25/2022  He is requesting all his scripts  I have reviewed and reconciled patient's problem list and medications  Physical Exam   Vitals: Blood pressure 119/70, pulse 73, temperature 98 5 °F (36 9 °C), temperature source Temporal, resp  rate 18, height 5' 8" (1 727 m), weight 69 2 kg (152 lb 8 oz), SpO2 100 %  ,Body mass index is 23 19 kg/m²  Constitutional:  Patient appears well-developed  HEENT: PERR, EOMI, MMM  Cardiovascular: Normal rate and regular rhythm  Pulmonary/Chest: Effort normal and breath sounds normal    Abdomen: Soft, + BS, NT    Assessment/Plan:  Amanda De La Fuente is a(n) 54 y o  male with MDD  MEDICAL CLEARANCE:  Patient is medically cleared for discharge  All scripts were sent out for the patient      1  Hypertension  Patient is on amlodipine 5 mg daily  2  Tobacco abuse  Patient may use nicotine transdermal patch 14 mg/24 hr   3  Alcohol abuse  Patient is on thiamine, folic acid and multivitamin  4  Arthralgias/low back pain  Patient may take Tylenol as needed, Lidocaine patch daily at 9:00 p m  to hip and low back  5  HIV disease  Patient is taking Biktarvy -25 mg daily  6  Insomnia  Patient is taking melatonin 3 mg daily  7  COURTNEY  Slightly improved from [facility] 02/24/2022 with BUN 16 mg/dL and creatinine 1 45 mg/dL compared to [facility] 02/21/2022 with a BUN 13 mg/dL and creatinine 1 47 mg/dL  8  Vitamin-D deficiency  Patient started on vitamin D bolus doses for 10 weeks followed by vitamin D3 1000 units daily  9  DJD/osteoarthritis with muscle spasm    Patient is on Robaxin 500 mg every 8 hours on as-needed basis along with Tylenol  The patient was discussed with Dr Robert Lainez and he is in agreement with the above note

## 2022-02-25 NOTE — PROGRESS NOTES
02/25/22 0730   Activity/Group Checklist   Group   (Pt check in with coffee/ covid control for unit)   Attendance Attended   Attendance Duration (min) 16-30   Interactions Interacted appropriately   Affect/Mood Appropriate;Calm   Goals Achieved Identified feelings; Identified triggers; Discussed coping strategies; Able to listen to others; Able to engage in interactions

## 2022-02-25 NOTE — PLAN OF CARE
Problem: DEPRESSION  Goal: Will be euthymic at discharge  Description: INTERVENTIONS:  - Administer medication as ordered  - Provide emotional support via 1:1 interaction with staff  - Encourage involvement in milieu/groups/activities  - Monitor for social isolation  Outcome: Progressing     Problem: ANXIETY  Goal: Will report anxiety at manageable levels  Description: INTERVENTIONS:  - Administer medication as ordered  - Teach and encourage coping skills  - Provide emotional support  - Assess patient/family for anxiety and ability to cope  Outcome: Progressing  Goal: By discharge: Patient will verbalize 2 strategies to deal with anxiety  Description: Interventions:  - Identify any obvious source/trigger to anxiety  - Staff will assist patient in applying identified coping technique/skills  - Encourage attendance of scheduled groups and activities  Outcome: Progressing     Problem: SUBSTANCE USE/ABUSE  Goal: Will have no detox symptoms and will verbalize plan for changing substance-related behavior  Description: INTERVENTIONS:  - Monitor physical status and assess for symptoms of withdrawal  - Administer medication as ordered  - Provide emotional support with 1 on 1 interaction with staff  - Encourage recovery focused program/ addiction education  - Assess for verbalization of changing behaviors related to substance abuse  - Initiate consults and referrals as appropriate (Case Management, Spiritual Care, etc )  Outcome: Progressing  Goal: By discharge, will develop insight into their chemical dependency and sustain motivation to continue in recovery  Description: INTERVENTIONS:  - Attends all daily group sessions and scheduled AA groups  - Actively practices coping skills through participation in the therapeutic community and adherence to program rules  - Reviews and completes assignments from individual treatment plan  - Assist patient development of understanding of their personal cycle of addiction and relapse triggers  Outcome: Progressing  Goal: By discharge, patient will have ongoing treatment plan addressing chemical dependency  Description: INTERVENTIONS:  - Assist patient with resources and/or appointments for ongoing recovery based living  Outcome: Progressing
